# Patient Record
Sex: MALE | Race: WHITE
[De-identification: names, ages, dates, MRNs, and addresses within clinical notes are randomized per-mention and may not be internally consistent; named-entity substitution may affect disease eponyms.]

---

## 2019-02-06 NOTE — RAD
CHEST TWO VIEWS:

 

Indication: Dyspnea. 

 

Comparison: 12-4-18

 

FINDINGS: 

Mild cardiomegaly is stable. Chronic lung changes are similar appearing. Multilevel spondylosis of th
e thoracic spine is similar appearing. 

 

IMPRESSION: 

Stable exam.

 

POS: [facility]

## 2019-07-03 NOTE — ULT
US Testicular W Doppler



History: Scrotal edema.



Comparison: Testicular ultrasound 2018



Findings: Real-time grayscale, color, and spectral analysis of the testicles was performed.



Right testicle measures 4.3 x 3.3 x 3.2 cm and the left testicle measures 4.5 x 2.1 x 3.7 cm. Mild di
latation of the rete testes with tubular ectasia and a rete testis cyst. Adequate vascular flow to

both testicles. No mass. Large left scrotal varicocele.



Impression: 

1. 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

2. Left scrotal varicocele.



Transcribed Date/Time: 7/3/2019 2:21 PM



Reported By: Duane Orellana 

Electronically Signed:  7/3/2019 4:58 PM

## 2019-07-21 NOTE — RAD
XR Lumbar Spine 2 Or 3 View



HISTORY: Back pain that radiates to left leg



COMPARISON: None.



FINDINGS: The vertebral bodies are normal in height. Degenerative osteophytes are present along the c
ourse of the spine. There is mild disc narrowing at L2-3 and marked disc narrowing at L5-S1.

Pedicles are intact. Degenerative facet changes are noted.



IMPRESSION: Moderate arthritic changes of the spine.



Reported By: Randy Narvaez 

Electronically Signed:  7/21/2019 12:13 PM

## 2019-07-22 NOTE — MRI
MRI lumbar spine noncontrast



HISTORY: Low back pain with left leg radiculopathy.



FINDINGS: Vertebral body heights and alignment are maintained. There are multiple heterogeneous ashley
s of increased T2 signal and decreased T1 signal throughout the vertebral bodies. There is not

associated compromise of the central spinal canal. Conus medullaris has normal appearance. Desiccatio
n of all of the intervertebral discs.



Images of the partially included retroperitoneum show confluent adenopathy extending into the pelvis.
 Dilatation of the partially visualized renal collecting structures of each kidney.



T12-L1, L1-2: Mild osteophytosis. Central canal and neural foramina are patent.



L2-3: Disc space narrowing. Minimal disc bulge. Degenerative changes of the facets. Mild bilateral fo
raminal stenoses.



L3-4: Minimal disc bulge. Degenerative changes of the facets. Central canal is patent. Mild stenosis 
of each neural foramen.



L4-5: Minimal disc bulge. Osteophytosis of the facets. Central canal is patent. Mild stenosis of each
 neural foramen.



L5-S1: Minimal posterior disc bulge. Degenerative changes of the facets. Mild stenosis of the left ne
ural foramen.







IMPRESSION: Bulky retroperitoneal adenopathy. Metastatic disease versus lymphomatous process. Please 
consider oncologic evaluation. Dedicated CT of the chest, abdomen, and pelvis would also be

appropriate.



Probable bilateral ureteral obstruction due to the retroperitoneal neoplasm.



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



Mild degenerative changes without significant nerve root compression.



Reported By: NILDA Mistry 

Electronically Signed:  7/22/2019 4:47 PM

## 2019-07-23 NOTE — CT
CT CHEST, ABDOMEN AND PELVIS WITH CONTRAST:

7/23/19

 

INDICATIONS:

Follow-up MRI lumbar spine which showed retroperitoneal adenopathy and evidence of osseous metastasis
. 

 

CT CHEST:

Lung fields show chronic parenchymal changes.  Stranding in the posterior lower lobes extending to th
e pleural surface appears chronic. No effusion or infiltrate. No pulmonary mass or nodule. No mediast
inal adenopathy. Fixed diaphragmatic hernia. Osseous structures unremarkable on CT. 

 

IMPRESSION: 

Chronic lung parenchymal changes. No acute chest abnormality.

 

CT ABDOMEN AND PELVIS:

Fixed diaphragmatic hernia is seen as described on chest CT. 

 

The liver, spleen, and pancreas unremarkable. Post cholecystectomy changes. Adrenal glands normal. 

Both kidneys show mild bilateral hydronephrosis. There is evidence of  bilateral ureteral obstruction
 in the pelvis due to adenopathy. The urinary bladder shows abnormal soft tissue mass invading the fl
oor of the bladder and involving both UVJ regions. 

 

Small bowel loops show nonspecific distention without dilatation. Scattered stool throughout the colo
n. 

 

Aorta is normal caliber. 

 

There is confluent bulky retroperitoneal adenopathy engulfing the aorta with confluent adenopathy see
n throughout the abdomen extending to the aortic bifurcation and continuing along both iliac chains i
nto the pelvis. Bulky adenopathy within both pelvic sidewalls along the iliac chains. 

 

A large confluent lobulated mass in the pelvis which involves the floor of the bladder.  This mass me
asures 10 cm width x 9 cm AP dimension in the axial plane. 

 

The osseous lesion seen in the lumbar spine on MRI are not as well delineated on CT. There are areas 
of heterogeneity and sclerosis within the vertebral bodies at these sites. No lytic process apparent.


 

IMPRESSION: 

A large lobulated soft tissue mass in the pelvis involves the floor of the bladder. There is bulky co
nfluent adenopathy throughout the retroperitoneum. Findings may represent primary bladder malignancy 
with associated adenopathy. Lymphoma is also a consideration.  This bladder mass is producing bilater
al ureteral obstruction. 

 

POS: [facility]

## 2019-07-24 NOTE — CON
DATE OF CONSULTATION:  



REASON FOR CONSULT:  Bladder mass.



HISTORY OF PRESENT ILLNESS:  Mr. Kilpatrick is a pleasant 78-year-old gentleman, who

over the last several weeks has developed lower back pain, left lower leg weakness,

and urinary incontinence.  He is being worked up by his primary care physician and

Dr. Zuñiga.  He underwent a lumbar spine MRI yesterday in the outpatient setting.

It showed bulky retroperitoneal adenopathy.  There were metastatic lesions in the

lumbar spine.  There was a bilateral ureteral obstruction due to retroperitoneal

adenopathy.  There was no central canal compromise.  He was sent to the emergency

room for further workup and evaluation.  He then underwent a chest, abdomen, and

pelvis CT, confirming the bilateral hydronephrosis.  There was an abnormal soft

tissue mass invading the floor of the bladder, it was measured 10 x 9 cm.  He has

been getting Norco for pain control and is feeling better.  Dr. Zuñiga has been

consulted for bladder biopsy. 



PAST MEDICAL HISTORY:  

1. Hypertension.

2. Diabetes mellitus type 2.

3. BPH.



PAST SURGICAL HISTORY:  

1. Appendectomy.

2. Achilles tendon repair.

3. Hernia repair.

4. Cholecystectomy.

5. Right shoulder surgery.

6. Cataract surgery.

7. Basal cell excision.

8. Cardiac catheterization.



ALLERGIES:  TO PENICILLIN AND SULFA.



HOME MEDICATIONS:  

1. Norvasc 5 mg daily.

2. Aspirin 81 daily.

3. Drisdol daily.

4. Prilosec daily.

5. Lovastatin daily.

6. Lyrica daily.

7. Selenium daily.

8. CoQ10 daily.

9. Valsartan/hydrochlorothiazide daily.



FAMILY HISTORY:  Noncontributory.



SOCIAL HISTORY:  Remote history of smoking.  No alcohol or illicit drug use.



REVIEW OF SYSTEMS:  CONSTITUTIONAL:  No fever, chills, or night sweats. 

EYES:  No blurred or double vision. 

ENT:  No pain, hoarseness, sore throat, or dysphagia. 

CV:  No chest pain, palpitations, or syncope. 

RESPIRATORY:  No shortness of breath, dyspnea on exertion, or orthopnea. 

GI:  No nausea, vomiting, diarrhea, constipation, or abdominal pain. 

:  Positive for dysuria.  No hematuria. 

MUSCULOSKELETAL:  Positive for back and leg pain. 

SKIN:  No rash or pruritus. 

HEMATOLOGIC:  No bleeding, bruising, or clotting. 

NEUROLOGIC:  Positive for weakness.  No headache.  Positive for numbness and

tingling on his feet. 



PHYSICAL EXAMINATION:

VITAL SIGNS:  Temperature is 98.4, pulse is 68, respiratory rate is 16, and BP is

128/62.  He is 96% on room air. 

GENERAL:  This is a well-developed, well-nourished male, in no acute distress. 

HEENT:  Normocephalic and atraumatic.  Pupils are equal and reactive to light. 

NECK:  Supple. 

CV:  Regular rate and rhythm. 

LUNGS:  Clear. 

ABDOMEN:  Soft and nontender.  Bowel sounds are positive. 

EXTREMITIES:  No clubbing, cyanosis, or edema. 

SKIN:  No rash. 

HEMATOLOGIC:  No petechiae or purpura. 

NEUROLOGIC:  He does have some left weakness, 4/5. 

PSYCH:  He is alert and oriented.



PERTINENT LABS AND X-RAYS:  Current WBCs are 8.2, hemoglobin 9.6, hematocrit 30.2,

platelet count 282,000, 76% neutrophils, and 13% lymphocytes.  Sodium is 132,

potassium is 3.5, chloride is 98, CO2 is 24, BUN is 28, creatinine is 1.35, lactic

acid is 0.8, calcium is 8.6, bilirubin is 0.7, AST is 13, ALT is 17, and alkaline

phosphatase is 219.  Troponin is negative and BNP is 477.  Serum total protein 6.5,

albumin 3.5, and globulin 3.0. 



ASSESSMENT:  Large bladder mass with bone and lymph node mass.



DISCUSSION:  Dr. Zuñiga has been consulted.  Plan is to do biopsy of his bladder

mass.  The patient's pain is being controlled with Norco.  We will discuss the

lumbar MRI with Dr. Darby.  There does not appear to be any area that can be

radiated for pain relief.  We will have further recommendations based on pathology

results. 



Thank you for the consult.  We will follow along with his hospital course.







Job ID:  [id number]

## 2019-07-24 NOTE — PRG
DATE OF SERVICE:  07/24/2019



SUBJECTIVE:  The patient is seen and examined at the bedside.  There were several

family members in the room during my visit.  The patient states that his pain is

quite well controlled on his pain medications at this time. 



OBJECTIVE:  VITAL SIGNS:  Blood pressure is 128/62, pulse is 68, temperature is

98.4, respirations 16, O2 saturation is 96% on room air. 

HEENT:  His head is atraumatic and normocephalic.  Eyes are PERRLA.  Sclerae are

nonicteric.  Conjunctivae palish.  Oral mucosa is moist. 

NECK:  Supple. 

LUNGS:  Clear. 

HEART:  S1, S2 normal. 

ABDOMEN:  Soft, nontender, nondistended. 

EXTREMITIES:  No clubbing.  There is some 1+ peripheral edema, especially on the

left side. 

NEUROLOGIC:  He is alert and oriented x4.  There is no any motor or sensory deficits

present.  Cranial nerves are intact. 



LABORATORY DATA:  Showed white count of 8.2, hemoglobin 9.6, hematocrit 30.2,

platelet count is 282,000.  Sodium of 132, potassium 3.5, chloride 98, CO2 of 24,

BUN 28, creatinine 1.35.  Glucose is within normal limits.  Calcium 8.6.

Microbiology; blood cultures x2, no growth.  Urine culture x1, no growth at 12

hours. 



IMPRESSION:  

1. 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  He is controlled with

hydrocodone and p.r.n. morphine. 

2. Acute kidney injury, mild hydronephrosis bilaterally.

3. Urinary bladder tumor.

4. Hypertension.



PLAN:  We are awaiting for Dr. Zuñiga, who is aware of the patient.  He is going to

come and set him up for most likely a cystoscopy and biopsy of the tumor and

__________ stents in both ureters to relieve the mild hydronephrosis.  The patient

was seen by Oncology team and we are awaiting biopsy at this point.  I am going to

start the 

patient on Senokot routine dosing since he will most likely get constipated from his

high dose of Norco he is taking at this point, and we will continue his other

medications with amlodipine, vitamin D, Pregabalin, Zocor, and Diovan.  Going to

stop his sliding scale and Accu-Cheks because he is not a diabetic. 







Job ID:  [id number]

## 2019-07-24 NOTE — PDOC.FPRHP
- History of Present Illness


Chief Complaint: Low back pain, new mets


History of Present Illness: 


Mr. Kilpatrick is a pleasant 78 year old man with no known history of malignancy 

who developed lower back pain


several weeks ago that has progressively worsened over the last couple of 

weeks. The pain has been shifting 


from radiating to left hip or right hip. He also has occasional pain down his 

left leg to his thigh. No lower leg 


weakness.  Reports long standing altered sensation in lower legs due to 

underlying neuropathy without


any recent changes.  Also has a previous injury to right achiles tendon which 

has always caused trouble


with walking.  





He reports intermittent urinary incontinence which he states tends to happen if 

he holds his urine too long and 


unexpectedly at night while sleeping, requiring him to wear adult depends at 

night.  He denies any


hematuria or dysuria.  No groin numbness or saddle anesthesia/paraesthesias.  

Has not experienced


any bowel incontinence, though he does suffer from constipation chronically, 

managed with colace


and metamucil without significant improvement. 





No headaches or dizziness. No blurred vision or speech disturbances. 


ED Course: 


He is s/p Lumbar spine xray following by MRI L Spine bulky retroperitoneal 


adenopathy. Metastatic disease versus lymphomatous process.  


Probable bilateral ureteral obstruction due to the retroperitoneal neoplasm.


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


Mild degnerative changes without significant nerve root compression. 





In ED he had a CT CAP: Large lobulated soft tissue mass in the pelvis involving 


the floor of the bladder.  Bulky confluent adenopathy thoughout peritoneum. 


Mass producing bilateral ureteral obstruction, it measures 10cm x 9 cm. 





- Allergies/Adverse Reactions


 Allergies











Allergy/AdvReac Type Severity Reaction Status Date / Time


 


Penicillins Allergy   Verified 07/24/19 01:23


 


Sulfa (Sulfonamide Allergy   Verified 07/24/19 01:23





Antibiotics)     














- Home Medications


 











 Medication  Instructions  Recorded  Confirmed  Type


 


Amlodipine [Norvasc] 5 mg PO QPM 07/24/19 07/24/19 History


 


Aspirin 81 mg PO DAILY 07/24/19 07/24/19 History


 


Calcium Carb/Magnesium Oxid/D3 1 tab DT DAILY 07/24/19 07/24/19 History





[Calcium Magnesium + Vitamin D]    


 


Ergocalciferol [Drisdol] 1.25 mg PO SEEPHYS 07/24/19 07/24/19 History


 


Esomeprazole Magnesium [NexIUM] 40 mg PO ASDIR 07/24/19 07/24/19 History


 


Fluticasone Propionate [Flonase 2 spray INH DAILY 07/24/19 07/24/19 History





Nasal Spray]    


 


Glucosam/Chondr-Msm1/D3/C/Bubba 1 tab PO DAILY 07/24/19 07/24/19 History





[Glucosamine Chondroitin Complex]    


 


Lovastatin [Mevacor] 20 mg PO HS 07/24/19 07/24/19 History


 


Multivit-Min/FA/Lycopen/Lutein 1 tab PO DAILY 07/24/19 07/24/19 History





[Centrum Silver Tablet]    


 


Pregabalin [Lyrica] 50 mg PO SEEPHYS 07/24/19 07/24/19 History


 


Selenium 100 mcg PO DAILY 07/24/19 07/24/19 History


 


Tadalafil 5 mg PO HS 07/24/19 07/24/19 History


 


Ubidecarenone [Co Q-10] 100 mg PO DAILY 07/24/19 07/24/19 History


 


Valsartan/Hydrochlorothiazide 1 tablet PO DAILY 07/24/19 07/24/19 History





[Diovan HCT]    














- History


PMHx:


 


PSHx: 





FHx:


 


Social:


 








- Vital signs


BP: []  HR: [] RR: [] Tmax: [] Pox: []% on []  Wt: []   








- Physical Exam


Constitutional: NAD, awake, alert and oriented, well developed


HEENT: normocephalic and atraumatic, PERRLA, EOMI, no scleral icterus, normal 

nasal mucosa, MMM, oropharynx clear


Neck: supple


Chest: no-tender to palpation


Heart: RRR, normal S1/S2, pulses present, no edema


Lungs: CTAB, no respiratory distress, good air movement, no rales/rhonchi, no 

wheezing, no retractions


Abdomen: soft, non-tender, bowel sounds present, no masses/distention


Musculoskeletal: normal structure, normal tone, ROM grossly normal


Neurological: no focal deficit, CN II-XII intact


Skin: no rash/lesions, good turgor


Psychiatric: normal mood and affect, good judgment and insight





FMR H&P: Results





- Labs


Result Diagrams: 


 07/23/19 18:14





 07/23/19 18:14


Lab results: 


 











WBC  8.0 thou/uL (4.8-10.8)   07/23/19  18:14    


 


Hgb  10.1 g/dL (14.0-18.0)  L  07/23/19  18:14    


 


Hct  30.6 % (42.0-52.0)  L  07/23/19  18:14    


 


MCV  87.7 fL (78.0-98.0)   07/23/19  18:14    


 


Plt Count  276 thou/uL (130-400)   07/23/19  18:14    


 


Neutrophils %  78.4 % (42.0-75.0)  H  07/23/19  18:14    


 


Sodium  130 mmol/L (136-145)  L  07/23/19  18:14    


 


Potassium  3.6 mmol/L (3.5-5.1)   07/23/19  18:14    


 


Chloride  97 mmol/L ()  L  07/23/19  18:14    


 


Carbon Dioxide  22 mmol/L (23-31)  L  07/23/19  18:14    


 


BUN  30 mg/dL (8.4-25.7)  H  07/23/19  18:14    


 


Creatinine  1.48 mg/dL (0.7-1.3)  H  07/23/19  18:14    


 


Glucose  116 mg/dL ()  H  07/23/19  18:14    


 


Lactic Acid  0.8 mmol/L (0.5-2.2)   07/23/19  18:31    


 


Calcium  8.6 mg/dL (7.8-10.44)   07/23/19  18:14    


 


Total Bilirubin  0.7 mg/dL (0.2-1.2)   07/23/19  18:14    


 


AST  13 U/L (5-34)   07/23/19  18:14    


 


ALT  17 U/L (8-55)   07/23/19  18:14    


 


Alkaline Phosphatase  219 U/L ()  H  07/23/19  18:14    


 


Serum Total Protein  6.5 g/dL (5.8-8.1)   07/23/19  18:14    


 


Albumin  3.5 g/dL (3.4-4.8)   07/23/19  18:14    


 


Urine Ketones  Negative mg/dL (Negative)   07/23/19  19:37    


 


Urine Blood  Negative  (Negative)   07/23/19  19:37    


 


Urine Nitrite  Negative  (Negative)   07/23/19  19:37    


 


Ur Leukocyte Esterase  Negative Garfield/uL (Negative)   07/23/19  19:37    














FMR H&P: Upper Level





- Plan


Date/Time: 07/24/19 0425








I, [], have evaluated this patient and agree with findings/plan as outlined by 

intern resident. Pertinent changes/additions are listed here.

## 2019-07-24 NOTE — CON
DATE OF CONSULTATION:  07/24/2019



CONSULTING PHYSICIAN:  Ange Max MD



REASON FOR CONSULTATION:  Bladder mass.



HISTORY OF PRESENT ILLNESS:  Mr. Kilpatrick is an 78-year-old white male, who is well

known to me for a history of previous urinary complaints.  He has had problems with

poor flow with urinary frequency and occasional urge incontinence.  He presented to

my office on June 24th, at which time, he was complaining of scrotal swelling and

swelling on the penis as well as lower extremity swelling.  It was not entirely

clear at that time where that was coming from.  We did order a scrotal ultrasound,

which did not show any specific abnormalities.  We had talked about treatment of BPH

more aggressively, but decided to work him up first with a cystoscopy and TRUS.

This was scheduled for August, but unfortunately, the patient had progressively

worsening back pain to the point where he could not tolerate.  He went to see his

PCP, who ordered an MRI of his back to see if he had a herniated disk, which

demonstrated a mass along the spine.  She then ordered a CT of the chest, abdomen,

pelvis, which demonstrated a large pelvic mass with several enlarged lymph nodes

throughout the retroperitoneum and abdomen.  The patient was then sent to the

emergency room where he was admitted to the hospital on the Oncology ralph.  He does

have evidence of bilateral hydronephrosis on CT with a pelvic mass, which appears to

be invading the posterior bladder wall.  I have been consulted for further

assistance on this issue.  On my discussion with the patient, he states that he is

having a lot of back pain.  He is still urinating approximately between 150 to 200

mL at a time, although he states that he has to urinate almost every hour.  He is

still having some difficulty with urination.  He denies having any blood in his

urine.  Urinalysis done at the time of the office visit in June did not demonstrate

any hematuria and was completely normal.  Urinalysis here also was normal.  The

patient also reports that he has been having fevers as well as night sweats at home.

 He has not lost a significant amount of weight, but states he has a very poor

appetite currently.  He continues to have some scrotal edema, although it is better

than before.  He still has some lower extremity edema as well. 



HOME MEDICATIONS:  

1. Lovastatin.

2. Nexium.

3. Amlodipine.

4. Ergocalciferol.

5. Diovan.

6. Coenzyme Q10.

7. Tadalafil.

8. Selenium.

9. Lyrica.

10. Multivitamin.

11. Chondroitin and glucosamine.

12. Flonase.

13. Calcium plus vitamin D.

14. Aspirin.



ALLERGIES:  

1. PENICILLIN.

2. SULFA.



PAST MEDICAL HISTORY:  

1. Hypertension.

2. Hyperlipidemia.

3. Gastroesophageal reflux disease.

4. BPH.

5. Hiatal hernia.

6. COPD.

7. Colon polyps.



PAST SURGICAL HISTORY:  

1. Nissen fundoplication. 

2. Inguinal hernia repair.

3. Cholecystectomy.

4. Keratosis removal.

5. Basal cell carcinoma removal.

6. Appendectomy.

7. Right knee meniscus repair.

8. Cataract surgery.

9. Heart catheterization.

10. Anterior tibialis tendon rupture repair.

11. EGD/colonoscopy.



FAMILY HISTORY:  Significant for diabetes and stroke as well as heart disease with a

daughter with breast cancer. 



SOCIAL HISTORY:  The patient is a former smoker, but quit in 1968.  Denies illicit

drugs.  Drinks alcohol socially only. 



REVIEW OF SYSTEMS:  A 12-point review of systems is unremarkable other than what was

commented on the HPI, specifically the fevers, night sweats, poor appetite, lower

extremity swelling, scrotal edema and back pain.  Remainder of review of systems is

otherwise negative. 



PHYSICAL EXAMINATION:

GENERAL:  Appears uncomfortable, is communicative, appears stated age.  Answering

questions appropriately.  Well nourished, well developed. 

HEENT:  Normocephalic, atraumatic.  Pupils are symmetric and round.  Sclerae

nonicteric.  Trachea midline.  Moist mucous membranes. 

CARDIOVASCULAR:  Regular rate and rhythm.  Normal S1 and S2.  Symmetric pulses. 

CHEST:  No increased work of breathing.  Symmetric expansion of the lungs, clear

anteriorly. 

ABDOMEN:  Soft, nontender, and nondistended.  Positive bowel sounds.  No

hepatosplenomegaly.  No hernias. 

GENITOURINARY:  There is mild scrotal edema with mild penile edema.  Some suprapubic

fullness.  Otherwise, testicles are bilaterally nontender. 

RECTAL:  Deferred at this time. 

EXTREMITIES:  No clubbing or cyanosis.  1+ edema bilaterally. 

MUSCULOSKELETAL:  No obvious joint deformities or joint erythema noted.  Full range

of motion, 4/5 strength. 

NEUROLOGIC:  Cranial nerves 2 through 12 grossly intact.  No focal or sensory motor

deficits identified. 

SKIN:  Warm and dry.  No rashes or lesions.  Good turgor. 

LYMPHS:  No obvious supraclavicular or inguinal lymphadenopathy. 

PSYCHIATRIC:  Alert and oriented x3.  Appropriate mood and affect.



LABORATORY EVALUATION:  The full set of labs are in the [facility] system, which I

have reviewed.  Of note, the patient's white count is 8.2 with hemoglobin 9.6,

platelet count of 282.  Creatinine is 1.35.  Urinalysis is completely normal.

Imaging, CT chest, abdomen and pelvis from July 23rd demonstrates a large lobulated

soft tissue mass in the pelvis involving the floor of the bladder.  There is bulky

confluent adenopathy throughout the retroperitoneum.  Findings may be consistent

with a primary bladder mass or possibly lymphoma.  The bladder mass is producing

bilateral ureteral obstruction with bilateral hydronephrosis. 



ASSESSMENT AND PLAN:  An 78-year-old white male with a large pelvic mass and

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  Given the

patient's constitutional symptoms of night sweats and fevers, as well as lymphedema

of the scrotum and lower extremities and in the pelvic area with previous history of

scrotal and genital swelling, this is the most likely diagnosis.  Bladder cancer is

extremely unlikely given the patient has had normal urinalysis on multiple occasions

without any reported history of hematuria.  The patient will need a tissue diagnosis

for confirmation, which should be easily accessible via the rectum.  I would

recommend that the patient go to the operating room tomorrow, where we will perform

a cystoscopy to ensure that he does not have any kind of bladder cancer or bladder

source.  If there is obvious mass within the bladder, we can resect this via

transurethral resection.  If the ureters are identifiable, we can attempt to place

ureteral stents bilaterally for relief of the obstruction.  If ureters cannot be

identified or ureteral stent cannot be passed, then the patient will likely require

bilateral nephrostomy tubes.  If tissue samples cannot be obtained from the bladder

and there is no obvious mucosal abnormalities, then, I will attempt to perform a

transrectal pelvic mass guided biopsy in the same fashion of how a prostate biopsy

is performed.  He will require an enema preoperatively and we will give him

levofloxacin in addition to the Ancef that he is already receiving.  This procedure

should be able to be done while on a baby aspirin as the bleeding risk should be

nominal.  I have discussed all of these procedures with the patient and he states he 

would like to proceed forward.  Risks of the surgery were discussed, which include,

but are not limited to significant bleeding, infection, damage to the urethra,

bladder or bladder perforation, rectal injury, intraabdominal bleeding,

nondiagnostic results, ureteral injury, and need for further procedures.  We will

plan to take him to the operating room tomorrow for the above-listed procedures. 





Job ID:  [id number]

## 2019-07-24 NOTE — PDOC.EVN
Event Note





- Event Note


Event Note: 





Patient Name: LATISHA KILPATRICK Medical Record Number: [id number]


YOB: 1940 Patient Status: Observation


Attending Provider: Aaron Medina Account Number: [id number]


Date: 07/24/19 04:25 Initialization Date: 07/24/19 04:25








DICTATION SYSTEM DOWN.  THIS SERVES AS H&P. 





- History of Present Illness


Chief Complaint: Low back pain, new mets


History of Present Illness: 


Mr. Kilpatrick is a pleasant 78 year old man with no known history of malignancy 

who developed lower back pain


several weeks ago that has progressively worsened over the last couple of 

weeks. The pain has been shifting 


from radiating to left hip or right hip. He also has occasional pain down his 

left leg to his thigh. No lower leg 


weakness.  Reports long standing altered sensation in lower legs due to 

underlying neuropathy without


any recent changes.  Also has a previous injury to right achiles tendon which 

has always caused trouble


with walking.  





He reports intermittent urinary incontinence which he states tends to happen if 

he holds his urine too long and 


unexpectedly at night while sleeping, requiring him to wear adult depends at 

night.  He denies any


hematuria or dysuria.  No groin numbness or saddle anesthesia/paraesthesias.  

Has not experienced


any bowel incontinence, though he does suffer from constipation chronically, 

managed with colace


and metamucil without significant improvement. 





No headaches or dizziness. No blurred vision or speech disturbances. 





ED Course: 


He is s/p Lumbar spine xray following by MRI L Spine bulky retroperitoneal 


adenopathy. Metastatic disease versus lymphomatous process.  


Probable bilateral ureteral obstruction due to the retroperitoneal neoplasm.


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


Mild degnerative changes without significant nerve root compression. 





In ED he had a CT CAP: Large lobulated soft tissue mass in the pelvis involving 


the floor of the bladder.  Bulky confluent adenopathy thoughout peritoneum. 


Mass producing bilateral ureteral obstruction, it measures 10cm x 9 cm. 





- Allergies/Adverse Reactions


 Allergies











Allergy/AdvReac Type Severity Reaction Status Date / Time


 


Penicillins Allergy   Verified 07/24/19 01:23


 


Sulfa (Sulfonamide Allergy   Verified 07/24/19 01:23





Antibiotics)     














- Home Medications


 











 Medication  Instructions  Recorded  Confirmed  Type


 


Amlodipine [Norvasc] 5 mg PO QPM 07/24/19 07/24/19 History


 


Aspirin 81 mg PO DAILY 07/24/19 07/24/19 History


 


Calcium Carb/Magnesium Oxid/D3 1 tab DT DAILY 07/24/19 07/24/19 History





[Calcium Magnesium + Vitamin D]    


 


Ergocalciferol [Drisdol] 1.25 mg PO SEEPHYS 07/24/19 07/24/19 History


 


Esomeprazole Magnesium [NexIUM] 40 mg PO ASDIR 07/24/19 07/24/19 History


 


Fluticasone Propionate [Flonase 2 spray INH DAILY 07/24/19 07/24/19 History





Nasal Spray]    


 


Glucosam/Chondr-Msm1/D3/C/Bubba 1 tab PO DAILY 07/24/19 07/24/19 History





[Glucosamine Chondroitin Complex]    


 


Lovastatin [Mevacor] 20 mg PO HS 07/24/19 07/24/19 History


 


Multivit-Min/FA/Lycopen/Lutein 1 tab PO DAILY 07/24/19 07/24/19 History





[Centrum Silver Tablet]    


 


Pregabalin [Lyrica] 50 mg PO SEEPHYS 07/24/19 07/24/19 History


 


Selenium 100 mcg PO DAILY 07/24/19 07/24/19 History


 


Tadalafil 5 mg PO HS 07/24/19 07/24/19 History


 


Ubidecarenone [Co Q-10] 100 mg PO DAILY 07/24/19 07/24/19 History


 


Valsartan/Hydrochlorothiazide 1 tablet PO DAILY 07/24/19 07/24/19 History





[Diovan HCT]    














- History


PMHx: 


1.  Hypertension 


2.  Diabetes, type 2


3.  BPH. 


 


PSHx: 


1.  Appendectomy. 


2.  Right achiles tendon repair. 


3.  Inguinal hernia repair.


4.  Cholecystectomy. 


5.  Right shoulder surgery. 


6.  Bilateral cataract surgery. 


7.  Basal cell carcinoma excision. 


8.  Heart catheterization.


 


Social: Previous smoker. Quit >50 years ago. No heavy/daily alcohol. No drug 

use. 


 








- Vital signs


BP: [144/69]  HR: [81] RR: [18] Tmax: [99.4] Pox: [98%]% on [RA]  








- Physical Exam


Constitutional: NAD, awake, alert and oriented, well developed


HEENT: normocephalic and atraumatic, PERRLA, EOMI, no scleral icterus, normal 

nasal mucosa, MMM, oropharynx clear


Neck: supple


Chest: no-tender to palpation


Heart: RRR, normal S1/S2, pulses present, no edema


Lungs: CTAB, no respiratory distress, good air movement, no rales/rhonchi, no 

wheezing, no retractions


Abdomen: soft, non-tender, bowel sounds present, no masses/distention


Musculoskeletal: normal structure, normal tone, ROM grossly normal


Neurological: no focal deficit, CN II-XII intact


Skin: no rash/lesions, good turgor


Psychiatric: normal mood and affect, good judgment and insight











- Labs


Result Diagrams: 


 07/23/19 18:14





 07/23/19 18:14


Lab results: 


 











WBC  8.0 thou/uL (4.8-10.8)   07/23/19  18:14    


 


Hgb  10.1 g/dL (14.0-18.0)  L  07/23/19  18:14    


 


Hct  30.6 % (42.0-52.0)  L  07/23/19  18:14    


 


MCV  87.7 fL (78.0-98.0)   07/23/19  18:14    


 


Plt Count  276 thou/uL (130-400)   07/23/19  18:14    


 


Neutrophils %  78.4 % (42.0-75.0)  H  07/23/19  18:14    


 


Sodium  130 mmol/L (136-145)  L  07/23/19  18:14    


 


Potassium  3.6 mmol/L (3.5-5.1)   07/23/19  18:14    


 


Chloride  97 mmol/L ()  L  07/23/19  18:14    


 


Carbon Dioxide  22 mmol/L (23-31)  L  07/23/19  18:14    


 


BUN  30 mg/dL (8.4-25.7)  H  07/23/19  18:14    


 


Creatinine  1.48 mg/dL (0.7-1.3)  H  07/23/19  18:14    


 


Glucose  116 mg/dL ()  H  07/23/19  18:14    


 


Lactic Acid  0.8 mmol/L (0.5-2.2)   07/23/19  18:31    


 


Calcium  8.6 mg/dL (7.8-10.44)   07/23/19  18:14    


 


Total Bilirubin  0.7 mg/dL (0.2-1.2)   07/23/19  18:14    


 


AST  13 U/L (5-34)   07/23/19  18:14    


 


ALT  17 U/L (8-55)   07/23/19  18:14    


 


Alkaline Phosphatase  219 U/L ()  H  07/23/19  18:14    


 


Serum Total Protein  6.5 g/dL (5.8-8.1)   07/23/19  18:14    


 


Albumin  3.5 g/dL (3.4-4.8)   07/23/19  18:14    


 


Urine Ketones  Negative mg/dL (Negative)   07/23/19  19:37    


 


Urine Blood  Negative  (Negative)   07/23/19  19:37    


 


Urine Nitrite  Negative  (Negative)   07/23/19  19:37    


 


Ur Leukocyte Esterase  Negative Garfield/uL (Negative)   07/23/19  19:37    




















- Impression/Plan


1.  Low back pain.  Secondary to new metastasis.  Discontinued toradol due to 

renal insufficiency. 


Will give hydrocodone 10/325 mg for mild/moderate pain and morphine 4 mg PRN 

for severe pain. No neuro


deficits at present, except for bladder incontinence which is likely due to 

large bladder mass rather than


due to spinal mets. 


2.  FRANCISCO JAVIER.  Gentle hydration. Patient with new bladder mass/lymphadenopathy 

causing ureteral obstruction. 


Patient still urinating.  Mild hydronephrosis on CT.  Urology consult placed. 


3.  New bladder mass.  Possibly bladder cancer, metastatic.  Consult placed to 

Oncology and as mentioned to


Urology.  He follows with Dr. Darby for BPH and Dr. Zuñiga.  Recent 

colonoscopy in 11/2018 with polyps removed. 


4.  Hypertension.  Resume home meds and monitor BP.


5.  Diabetes.  Hold home meds due to renal function.  Insulin sliding scale and 

monitor glucose. 


6.  GI Prophylaxis. 


7.  DVT prophylaxis.  Rod ventura.  Hold on pharmacoprophylaxis given possibility 

of procedures/biopsy. 


8.  Code Status:  FULL.  Consider palliative care consult for advanced 

directives/complex decision making. 





DIC

## 2019-07-25 NOTE — OP
DATE OF PROCEDURE:  07/25/2019



SERVICE:  Urology.



PREOPERATIVE DIAGNOSIS:  Pelvic mass with bilateral hydronephrosis.



POSTOPERATIVE DIAGNOSIS:  Pelvic mass with bilateral hydronephrosis.



PROCEDURES PERFORMED:  Cystoscopy with bilateral ureteral stent placement, 6 x 26

double-J stent, right retrograde pyelogram, and transrectal pelvic mass biopsy. 



INDICATIONS FOR PROCEDURE:  Mr. Kilpatrick is an 78-year-old white male who previously

had seen me for BPH and lower urinary symptoms.  He developed scrotal edema which

was being worked up during this time, ended up with severe back pain, which was

found to be caused by significant retroperitoneal lymphadenopathy and a large pelvic

mass.  He also had bilateral hydronephrosis.  He is now coming to the OR for

attempted stent placement, diagnostic cystoscopy, and transrectal biopsy of the mass

for tissue diagnosis.  Risks and benefits were discussed and he has agreed to

proceed forward. 



DESCRIPTION OF PROCEDURE:  After identification of armband and verification of

consent, the patient was brought back to the operating room where he underwent

general anesthesia with an LMA.  He was placed in dorsal lithotomy position and

prepped and draped in usual sterile fashion.  After appropriate time-out, a

lubricated 22-Croatian rigid cystoscope was introduced per urethra into the bladder.

The prostate was only mildly hypertrophic, but had a high bladder neck, likely

secondary to anterior compression from the pelvic mass.  The bladder mucosa appeared

normal.  There were no tumors or irregularities along the mucosa.  Grade 2

trabeculation.  No cellules or diverticula.  There was a large bulge along the right

pelvic floor indenting into the bladder, but not through the bladder.  The mucosa

was normal.  Both ureters were identified although they were difficult to identify.

The right wound was significantly more deformed than the left.  A 5-Croatian Pollack

catheter was able to be inserted into the distal right ureter and a retrograde

pyelogram performed indicating we were indeed within the ureter, which showed

significant hydronephrosis and a tortuous ureter.  A Sensor wire was advanced

through the Pollack catheter up to the level of renal pelvis.  The Pollack catheter

was then removed and a 6 x 26 double-J stent advanced over the Sensor wire up to the

level of renal pelvis.  The wire was then removed leaving a good curl in the kidney

and a good curl in the bladder.  Attention was turned to the left ureteral orifice

at which the same procedure was repeated, but without the Pollack catheter or the

retrograde pyelogram.  The Sensor wire was able to be advanced up with ease.  The 6

x 26 double-J stent and then the wire removed leaving curled on both the kidney and

bladder portions of the stent.  The bladder was then emptied and the cystoscope

removed.  The patient was then changed to the left lateral decubitus position with

the knees tucked up toward the chest.  A transrectal ultrasound probe was introduced

per rectum with adequate lubrication.  The large pelvic mass was seen primarily on

the right side of the patient's pelvis with significant anterior compression of the

bladder and possible invasion into the prostate.  The seminal vesicles could not be

identified.  Using the transrectal biopsy needle, the mass was biopsied to a total

of 10 times from different portions including the middle, anterior, posterior, and

lateral aspects of the mass.  These were all sent for fresh frozen sections as

requested by Dr. Che, who stated that if this suspected diagnosis is lymphoma,

the specimen should not go in formalin.  These were all submitted as expeditiously

as possible to the pathology lab.  The rectal probe was then removed.  There was no

significant bleeding noted.  The patient was then returned back to the supine

position.  He was awakened, taken to PACU for recovery in stable condition. 



COMPLICATIONS:  None.



ESTIMATED BLOOD LOSS:  Minimal.



RETAINED TUBES AND DRAINS:  A 6 x 26 double-J stents bilaterally.



SPECIMENS:  Transrectal pelvic mass biopsies.



DISPOSITION:  The patient will be admitted back to the hospital on the Hospitalist

Service.  We will await the biopsy results to determine what the next best course of

action is.  I will also monitor his creatinine.  If the patient remains

hydronephrotic with elevated creatinine despite stents, then I would recommend we

performed cystoscopic stent removals and the patient have a percutaneous nephrostomy

tubes placed instead. 





Job ID:  [id number]

## 2019-07-25 NOTE — RAD
RETROGRADE PYELOGRAM:

7/25/19

 

INDICATIONS:

Fluoroscopic images taken during retrograde study with stent placement. Two images are presented.

 

FINDINGS/IMPRESSION: 

Initial image show opacification of the right ureter. Partial opacification of the right collecting s
tructure.

 

The final image shows bilateral ureteral stents. Residual contrast in the right upper collecting stru
ctures on this second image. 

 

POS: SUSANNAH

## 2019-07-25 NOTE — PDOC.HOSPP
- Subjective


Subjective: 





urinary frequency, fever, some cough





- Objective


Vital Signs & Weight: 


 Vital Signs (12 hours)











  Temp Pulse Resp BP Pulse Ox


 


 07/25/19 05:32  98.1 F    


 


 07/24/19 21:43  98.6 F    


 


 07/24/19 20:00  100.3 F H  74  18  125/56 L  99








 Weight











Weight                         205 lb














I&O: 


 











 07/24/19 07/25/19 07/26/19





 06:59 06:59 06:59


 


Intake Total  1450 


 


Output Total  800 


 


Balance  650 











Result Diagrams: 


 07/24/19 03:59





 07/24/19 03:59


Additional Labs: 


 Accuchecks











  07/24/19





  12:32


 


POC Glucose  85














ROS





- Review of Systems


All systems: 


All other ROS were reviewed and found negative.








- Medication


Medications: 


Active Medications











Generic Name Dose Route Start Last Admin





  Trade Name Freq  PRN Reason Stop Dose Admin


 


Acetaminophen  650 mg  07/24/19 02:18  07/24/19 19:45





  Tylenol  PO   650 mg





  Q4H PRN   Administration





  Headache/Fever/Mild Pain (1-3)   





     





     





     


 


Hydrocodone Bitart/Acetaminophen  1 tab  07/24/19 02:23  07/25/19 05:27





  [place] 10/325  PO   1 tab





  Q4H PRN   Administration





  Moderate Pain (4-6)   





     





     





     


 


Amlodipine Besylate  5 mg  07/24/19 21:00  07/24/19 19:45





  Norvasc  PO   5 mg





  QPM GRACE   Administration





     





     





     





     


 


Coenzyme Q10  100 mg  07/24/19 09:00  07/24/19 08:10





  Coenzyme Q10  PO   Not Given





  DAILY GRACE   





     





     





     





     


 


Famotidine  20 mg  07/24/19 09:00  07/24/19 08:10





  Pepcid  SLOW IVP   20 mg





  DAILY GRACE   Administration





     





     





     





     


 


Guaifenesin  200 mg  07/25/19 05:43  07/25/19 06:06





  Robitussin Sf  PO   200 mg





  Q8H PRN   Administration





  Cough   





     





     





     


 


Hydrochlorothiazide  25 mg  07/24/19 09:00  07/24/19 08:10





  Hydrochlorothiazide  PO   25 mg





  DAILY GRACE   Administration





     





     





     





     


 


Sodium Chloride  1,000 mls @ 100 mls/hr  07/24/19 15:30  07/25/19 01:56





  Normal Saline 0.9%  IV   1,000 mls





  .Q10H GRACE   Administration





     





     





     





     


 


Pregabalin  50 mg  07/24/19 09:00  07/24/19 08:09





  Lyrica  PO   50 mg





  QAM GRACE   Administration





     





     





     





     


 


Pregabalin  100 mg  07/24/19 21:00  07/24/19 19:46





  Lyrica  PO   100 mg





  QPM GRACE   Administration





     





     





     





     


 


Senna/Docusate Sodium  2 tab  07/24/19 21:00  07/24/19 19:45





  Senokot S  PO   2 tab





  BID GRACE   Administration





     





     





     





     


 


Simvastatin  10 mg  07/24/19 21:00  07/24/19 19:45





  Zocor  PO   10 mg





  HS GRACE   Administration





     





     





     





     


 


Sodium Chloride  10 ml  07/24/19 09:00  07/24/19 19:46





  Flush - Normal Saline  IVF   Not Given





  Q12HR GRACE   





     





     





     





     


 


Valsartan  320 mg  07/24/19 09:00  07/24/19 08:10





  Diovan  PO   320 mg





  DAILY GRACE   Administration





     





     





     





     














- Exam


NAD


Neck: no JVD


Heart: RRR, no murmur


Respiratory: CTAB, no wheezes, no rales, no ronchi


Gastrointestinal: soft, non-tender, normal bowel sounds


Extremities: no edema





Hosp A/P


(1) Pelvic mass


Code(s): R19.00 - INTRA-ABD AND PELVIC SWELLING, MASS AND LUMP, UNSP SITE   

Status: Acute   


Plan: 


cystoscopy, Bx today








(2) Bacteremia


Code(s): R78.81 - BACTEREMIA   Status: Acute   


Plan: 


1/2 blood C&S pos








(3) DM type 2 (diabetes mellitus, type 2)


Status: Acute   


Qualifiers: 


   Diabetes mellitus long term insulin use: without long term use   Diabetes 

mellitus complication status: with kidney complications 





(4) Metastatic bone cancer


Code(s): C41.9 - MALIGNANT NEOPLASM OF BONE AND ARTICULAR CARTILAGE, UNSP   

Status: Acute   





(5) Hydronephrosis


Code(s): N13.30 - UNSPECIFIED HYDRONEPHROSIS   Status: Acute   


Qualifiers: 


   Hydronephrosis type: unspecified   Qualified Code(s): N13.30 - Unspecified 

hydronephrosis   





(6) HTN (hypertension)


Code(s): I10 - ESSENTIAL (PRIMARY) HYPERTENSION   Status: Chronic   


Qualifiers: 


   Hypertension type: essential hypertension   Qualified Code(s): I10 - 

Essential (primary) hypertension   





- Plan





cystoscopy/ BX today. cont iv antibx pending Sensitivities. monitor renal fcn- 

acute bs CKD

## 2019-07-26 NOTE — PRG
DATE OF SERVICE:  07/26/2019



SUBJECTIVE:  The patient is seen and examined at the bedside.  He noticed swelling

of his testicles.  He had some bowel movement.  His appetite is somewhat better. 



OBJECTIVE:  VITAL SIGNS:  Blood pressure is 167/77, pulse is 74, temperature 99.0,

respiratory rate is 18, O2 saturation 99% on room air. 

HEENT:  Head is atraumatic and normocephalic.  Skin looks palish.  Conjunctivae

palish.  Oral mucosa is moist. 

NECK:  Supple. 

LUNGS:  Clear. 

HEART:  S1 and S2 normal.  No S3.  No S4. 

ABDOMEN:  Soft.  Mildly distended.  No guarding.  No masses. 

EXTREMITIES:  1+ to 2+ peripheral edema.  Scrotum swollen.  Non-tenderness on

palpation. 

NEUROLOGICAL:  He is alert and oriented x4.  There is no any sensory or motor

deficits. 



LABORATORY DATA:  None.



IMPRESSION:  

1. Pelvic mass, status post biopsy awaiting for the pathology report to come back.

2. Bacteremia with Streptococcus agalactiae.  We are going to switch him from Ancef

to Rocephin 2 g every 24 hours. 

3. Diabetes mellitus.

4. Metastatic bone cancer.

5. Hydronephrosis, status post bilateral stent placement by Dr. Zuñiga.

6. Peripheral edema and scrotal swelling.  We will stop his IV fluids and give him 1

dose of Lasix 40 mg IV push x1, and we will monitor this closely.  Awaiting for

pathology report. 







Job ID:  [id number]

## 2019-07-26 NOTE — PRG
DATE OF SERVICE:  07/26/2019



SUBJECTIVE:  The patient states that he is feeling well.  He has no pain, no rectal

bleeding.  He does have worsening overactive bladder with urge incontinence, now

that he has ureteral stents in.  He did undergo bilateral ureteral stenting and

transrectal mass biopsy yesterday. 



OBJECTIVE:  VITAL SIGNS:  Temperature 99, pulse 74, respirations 18, blood pressure

167/77, saturation 99% on room air. 

GENERAL:  No apparent distress, communicative and alert. 

CARDIOVASCULAR:  Regular rate and rhythm. 

ABDOMEN:  Soft, nontender, and nondistended.  Positive bowel sounds. 

:  Worsening scrotal edema with mild dribbling of urine from the penis. 

EXTREMITIES:  2+ edema bilaterally.



ASSESSMENT AND PLAN:  An 78-year-old white male with bilateral hydronephrosis and

pelvic mass causing ureteral obstruction, status post ureteral stenting and pelvic

mass biopsy.  The worsening edema is likely attributable to fluid overload.  I

recommend stopping his IV fluids and the hospitalist has inquired if they can give

him Lasix, which I would agree with.  I would like to check his creatinine tomorrow

to ensure that it is coming down as it should be if the stents are working.  I will

also order a renal ultrasound.  If the ultrasound shows minimal to no hydronephrosis

and the creatinine is improved, from my standpoint, I think the patient can be

discharged home.  If the hydronephrosis is persistent or his creatinine is more

elevated with persistent obstruction, then the patient will likely need to have his

stents removed and go for percutaneous nephrostomy tubes instead.  Per Pathology,

the transrectal biopsy results should be available on Monday.  Dr. Aldana will be

on-call over the weekend and I will sign out to him to check in on ultrasound

report.  Otherwise, from my standpoint, if the patient has improved renal function,

he can be discharged home and I will see him on an outpatient basis in approximately

3 to 4 weeks. 







Job ID:  [id number]

## 2019-07-27 NOTE — PDOC.HOSPP
- Subjective


Subjective: 


79 y/o male with DM, HTN and BPH admitted due to worsening low back pain. 

Evaluation with imaging revealed bulky retroperitoneal adenopathy, pelvic mass 

and bilateral hydronephrosis. No new problem. Concerned about increase in creat 

today.





- Objective


Vital Signs & Weight: 


 Vital Signs (12 hours)











  Temp Pulse Resp BP BP BP BP


 


 07/27/19 17:41  99.7 F H  77  20    161/73 H 


 


 07/27/19 08:34  99.0 F  84  16     166/72 H


 


 07/27/19 08:22     180/86 H  166/72 H  














  Pulse Ox


 


 07/27/19 17:41  94 L


 


 07/27/19 08:34  94 L


 


 07/27/19 08:22 








 Weight











Weight                         205 lb














I&O: 


 











 07/26/19 07/27/19 07/28/19





 06:59 06:59 06:59


 


Intake Total 2240 870 


 


Output Total 325 785 80


 


Balance 1915 85 -80











Result Diagrams: 


 07/24/19 03:59





 07/27/19 05:23





ROS





- Review of Systems


All systems: 


All other ROS were reviewed and found negative.








- Medication


Medications: 


Active Medications











Generic Name Dose Route Start Last Admin





  Trade Name Freq  PRN Reason Stop Dose Admin


 


Acetaminophen  650 mg  07/24/19 02:18  07/24/19 19:45





  Tylenol  PO   650 mg





  Q4H PRN   Administration





  Headache/Fever/Mild Pain (1-3)   





     





     





     


 


Hydrocodone Bitart/Acetaminophen  1 tab  07/24/19 02:23  07/26/19 22:26





  [place] 10/325  PO   1 tab





  Q4H PRN   Administration





  Moderate Pain (4-6)   





     





     





     


 


Coenzyme Q10  100 mg  07/24/19 09:00  07/27/19 09:34





  Coenzyme Q10  PO   100 mg





  DAILY GRACE   Administration





     





     





     





     


 


Famotidine  20 mg  07/24/19 09:00  07/27/19 09:35





  Pepcid  SLOW IVP   20 mg





  DAILY GRACE   Administration





     





     





     





     


 


Guaifenesin  200 mg  07/25/19 05:43  07/26/19 23:27





  Robitussin Sf  PO   200 mg





  Q8H PRN   Administration





  Cough   





     





     





     


 


Ceftriaxone Sodium 2 gm/  100 mls @ 200 mls/hr  07/26/19 12:00  07/27/19 14:29





  Sodium Chloride  IVPB   100 mls





  Q24HR GRACE   Administration





     





     





     





     


 


Mirabegron  50 mg  07/27/19 09:00  07/27/19 09:37





  Myrbetriq Er  PO   50 mg





  DAILY GRACE   Administration





     





     





     





     


 


Oxybutynin Chloride  10 mg  07/26/19 09:00  07/27/19 09:36





  Ditropan Xl  PO   10 mg





  DAILY GRACE   Administration





     





     





     





     


 


Pregabalin  50 mg  07/24/19 09:00  07/27/19 09:35





  Lyrica  PO   50 mg





  QAM GRACE   Administration





     





     





     





     


 


Pregabalin  100 mg  07/24/19 21:00  07/26/19 21:51





  Lyrica  PO   100 mg





  QPM GRACE   Administration





     





     





     





     


 


Senna/Docusate Sodium  2 tab  07/24/19 21:00  07/27/19 09:34





  Senokot S  PO   2 tab





  BID GRACE   Administration





     





     





     





     


 


Simvastatin  10 mg  07/24/19 21:00  07/26/19 21:51





  Zocor  PO   10 mg





  HS GRACE   Administration





     





     





     





     


 


Sodium Biphosphate/Sodium Phosphate  133 ml  07/25/19 08:00  07/26/19 09:19





  Fleet Enema  CT   Not Given





  0800 GRACE   





     





     





     





     


 


Sodium Chloride  10 ml  07/24/19 09:00  07/26/19 21:56





  Flush - Normal Saline  IVF   10 ml





  Q12HR GRACE   Administration





     





     





     





     














- Exam


awake alert


Eye: PERRL, anicteric sclera


ENT: normocephalic atraumatic


Neck: supple, symmetric, no JVD


Heart: RRR


Respiratory: CTAB


Gastrointestinal: soft, non-tender, non-distended, normal bowel sounds


Extremities: no cyanosis, 2+ LE edema


Neurological: CN's grossly intact, no focal deficits


Psychiatric: normal affect, A&O x 3





Hosp A/P


(1) FRANCISCO JAVIER (acute kidney injury)


Code(s): N17.9 - ACUTE KIDNEY FAILURE, UNSPECIFIED   Status: Acute   





(2) Obstructive uropathy


Code(s): N13.9 - OBSTRUCTIVE AND REFLUX UROPATHY, UNSPECIFIED   Status: Acute   





(3) Bilateral hydronephrosis


Code(s): N13.30 - UNSPECIFIED HYDRONEPHROSIS   Status: Acute   





(4) BPH (benign prostatic hyperplasia)


Code(s): N40.0 - BENIGN PROSTATIC HYPERPLASIA WITHOUT LOWER URINRY TRACT SYMP   

Status: Acute   





(5) Bilateral leg edema


Code(s): R60.0 - LOCALIZED EDEMA   Status: Acute   





(6) DM type 2 (diabetes mellitus, type 2)


Status: Acute   


Qualifiers: 


   Diabetes mellitus long term insulin use: without long term use   Diabetes 

mellitus complication status: with kidney complications 





(7) Metastatic bone cancer


Code(s): C41.9 - MALIGNANT NEOPLASM OF BONE AND ARTICULAR CARTILAGE, UNSP   

Status: Acute   





(8) Pelvic mass


Code(s): R19.00 - INTRA-ABD AND PELVIC SWELLING, MASS AND LUMP, UNSP SITE   

Status: Acute   





(9) HTN (hypertension)


Code(s): I10 - ESSENTIAL (PRIMARY) HYPERTENSION   Status: Chronic   


Qualifiers: 


   Hypertension type: essential hypertension   Qualified Code(s): I10 - 

Essential (primary) hypertension   





- Plan


Get Repeat Renal US given recent stent placement and worsening renal function


Also DC HCTZ and losartan.


increase amlodipine to 10 mg daily


Get bilateral lower extremity dopoplers for DVT


Urology following anad Percutanoeous nephrostom=y is contemplated if 

hydronephrosis persisted.


get urine electrolytes


Care plan discussed with patient and relatives.


We will follow ordered disgnostic tests.


Follow renal function.

## 2019-07-27 NOTE — PRG
DATE OF SERVICE:  07/27/2019



SUBJECTIVE:  The patient is having urinary urgency, continues to have back pain, but

no new problems. 



OBJECTIVE:  VITAL SIGNS:  Blood pressure 166/72, temperature 99, O2 saturation 94%

on room air, and pulse 84. 

ABDOMEN:  Soft and nontender.  No palpable masses.  Liver and spleen, not palpable.

No abdominal tenderness noted.  Urine output yesterday 325 mL. 



LABORATORY DATA:  Creatinine on 07/27 equals 2.58.  Creatinine on 07/23 equals 1.48.



IMPRESSION:  Mr. Kilpatrick is an 78-year-old gentleman, status post retrograde

pyelography and stent placement on 07/25/2019, done in conjunction with transrectal

biopsy of a pelvic mass.  He presented with a pelvic mass and bilateral ureteral

obstruction.  His creatinine was elevated, so stents were placed.  Unfortunately,

they are not functioning well and in fact, his renal function has worsened based on

his creatinine measurement.  Renal ultrasound has been done this morning, but has

not been read.  I have contacted the radiologist, Dr. Baron, who will contact me

once the ultrasound has been read.  If ultrasound confirms unresolved or worsening

hydronephrosis, bilateral percutaneous nephrostomy tubes will be necessary.  The

patient is aware of this as our family members. 



PLAN:  Bilateral percutaneous nephrostomy tubes for relief of bilateral ureteral

obstruction, unrelieved by ureteral stents.  Pending confirmation of unresolved or

worsening hydronephrosis on ultrasound harman harris 







Job ID:  [id number]

## 2019-07-27 NOTE — ULT
Exam: Bilateral renal ultrasound



HISTORY: Hydronephrosis



COMPARISON: CT of the chest, abdomen and pelvis dated July 23, 2019



FINDINGS: 

Right kidney: There is moderate right-sided hydronephrosis that does not appreciably changed from the
 comparison study. No right-sided ureteral jet is identified at the level of the bladder. The right

ureteral stent is not definitely seen.

Right kidney measurements: 11.2 x 6.3 x 6.5 cm

Left kidney: The dilatation of the left renal collecting system is reduced from the comparison CT exa
mination. There is partial visualization of the distal left ureteral stent and left ureteral jet at

the level of the bladder. Mild amount of residual hydronephrosis remains within the left renal collec
ting system.

Left kidney measurements: 13.6 x 7.8 x 5.8 cm.



Urinary bladder: There is a left ureteral stent and left ureteral jet partially visualized. Bulky mas
s at the bladder base is present in the region of the prostate measuring 9.8 x 8.3 cm.





IMPRESSION: 

1. Moderate right hydronephrosis is not appreciably changed from the comparison CT examination follow
ing placement of a right ureteral stent.

2. Left ureteral stent is partially visualized within the lower bladder with associated left ureteral
 jet. There is mild residual left-sided hydronephrosis. This is improved from the prior CT

evaluation.

3. Bulky lower pelvic mass possibly related to prominent lymphadenopathy or prostate enlargement.

4. Findings called to Dr. Aldana at 10:30 AM on July 27, 2019.



Reported By: Geovany Juárez 

Electronically Signed:  7/27/2019 10:54 AM

## 2019-07-27 NOTE — ULT
Ultrasound guidance for nephrostomy tube placement



INDICATION: Moderate right and mild left hydronephrosis due to distal obstruction.



TECHNIQUE: Ultrasound was utilized for percutaneous nephrostomy tube placement within the right renal
 collecting system. Please see the fluoroscopic nephrostomy procedure note for full details

concerning the procedure.



FINDINGS: There is mild left and moderate right hydronephrosis involving the kidneys.



IMPRESSION: Ultrasound guidance utilized for percutaneous access of the right renal collecting system
.



Reported By: Geovany Juárez 

Electronically Signed:  7/27/2019 5:32 PM

## 2019-07-28 NOTE — ULT
BILATERAL LOWER EXTREMITY VENOUS DOPPLER ULTRASOUND:

 

Date:  07/28/19 

 

HISTORY:  

Bilateral lower extremity edema. 

 

TECHNIQUE:  

Gray scale ultrasound with color flow and spectral Doppler imaging of the deep venous systems of the 
lower extremities was performed bilaterally. 

 

FINDINGS:

There is good flow, compression, and augmentation noted in the common femoral, femoral, deep femoral,
 popliteal, posterior tibial, and greater saphenous veins on either side. 

 

IMPRESSION: 

No evidence of deep venous thrombosis in either lower extremity.  

 

POS: MAXIMO

## 2019-07-28 NOTE — PDOC.HOSPP
- Subjective


Subjective: 


79 y/o male with DM, HTN and BPH admitted due to worsening low back pain. 

Evaluation with imaging revealed bulky retroperitoneal adenopathy, pelvic mass 

and bilateral hydronephrosis. S/p cystoscopy with bilateral stent placement. 

However found to have persistent of right hydronephrosis hence right 

percutaneous nephrostomy tube placement. Had minimal out put from the uretrhra 

and bladder scan showed >300 urine. still having back pain but denied fever, 

chest pain or SOB. Oral intake is poor. 








- Objective


Vital Signs & Weight: 


 Vital Signs (12 hours)











  Temp Pulse Resp BP BP Pulse Ox


 


 07/28/19 09:09   78   167/75 H  


 


 07/28/19 07:40  98.8 F  78  16   167/75 H  94 L


 


 07/28/19 03:29  98.5 F  79  16   159/74 H  95


 


 07/28/19 00:00  98.5 F  77  16   166/79 H  95








 Weight











Weight                         205 lb














I&O: 


 











 07/27/19 07/28/19 07/29/19





 06:59 06:59 06:59


 


Intake Total 870  


 


Output Total 785 2880 1750


 


Balance 85 -2880 -1750











Result Diagrams: 


 07/24/19 03:59





 07/28/19 03:49





ROS





- Review of Systems


All systems: 


All other ROS were reviewed and found negative.








- Medication


Medications: 


Active Medications











Generic Name Dose Route Start Last Admin





  Trade Name Freq  PRN Reason Stop Dose Admin


 


Acetaminophen  650 mg  07/24/19 02:18  07/24/19 19:45





  Tylenol  PO   650 mg





  Q4H PRN   Administration





  Headache/Fever/Mild Pain (1-3)   





     





     





     


 


Hydrocodone Bitart/Acetaminophen  1 tab  07/24/19 02:23  07/28/19 07:54





  [place] 10/325  PO   1 tab





  Q4H PRN   Administration





  Moderate Pain (4-6)   





     





     





     


 


Amlodipine Besylate  10 mg  07/28/19 09:00  07/28/19 09:09





  Norvasc  PO   10 mg





  DAILY GRACE   Administration





     





     





     





     


 


Coenzyme Q10  100 mg  07/24/19 09:00  07/28/19 09:09





  Coenzyme Q10  PO   Not Given





  DAILY GRACE   





     





     





     





     


 


Famotidine  20 mg  07/24/19 09:00  07/28/19 09:11





  Pepcid  SLOW IVP   20 mg





  DAILY GRACE   Administration





     





     





     





     


 


Guaifenesin  200 mg  07/25/19 05:43  07/26/19 23:27





  Robitussin Sf  PO   200 mg





  Q8H PRN   Administration





  Cough   





     





     





     


 


Ceftriaxone Sodium 2 gm/  100 mls @ 200 mls/hr  07/26/19 12:00  07/27/19 14:29





  Sodium Chloride  IVPB   100 mls





  Q24HR GRACE   Administration





     





     





     





     


 


Mirabegron  50 mg  07/27/19 09:00  07/28/19 09:08





  Myrbetriq Er  PO   50 mg





  DAILY GRACE   Administration





     





     





     





     


 


Oxybutynin Chloride  10 mg  07/26/19 09:00  07/28/19 09:09





  Ditropan Xl  PO   10 mg





  DAILY GRACE   Administration





     





     





     





     


 


Pregabalin  50 mg  07/24/19 09:00  07/28/19 09:10





  Lyrica  PO   50 mg





  QAM GRACE   Administration





     





     





     





     


 


Pregabalin  100 mg  07/24/19 21:00  07/27/19 21:11





  Lyrica  PO   100 mg





  QPM GRACE   Administration





     





     





     





     


 


Senna/Docusate Sodium  2 tab  07/24/19 21:00  07/28/19 09:10





  Senokot S  PO   2 tab





  BID GRACE   Administration





     





     





     





     


 


Simvastatin  10 mg  07/24/19 21:00  07/27/19 21:10





  Zocor  PO   10 mg





  HS GRACE   Administration





     





     





     





     


 


Sodium Biphosphate/Sodium Phosphate  133 ml  07/25/19 08:00  07/28/19 10:21





  Fleet Enema  MA   133 ml





  0800 GRACE   Administration





     





     





     





     


 


Sodium Chloride  10 ml  07/24/19 09:00  07/28/19 09:16





  Flush - Normal Saline  IVF   10 ml





  Q12HR GRACE   Administration





     





     





     





     














- Exam


awake alert


Eye: PERRL, anicteric sclera


ENT: normocephalic atraumatic


Neck: no JVD


Heart: RRR


Respiratory: CTAB


Gastrointestinal: soft, non-tender, non-distended, normal bowel sounds


Extremities: 2+ LE edema


Neurological: CN's grossly intact, no focal deficits


Psychiatric: normal affect, A&O x 3





Hosp A/P


(1) FRANCISCO JAVIER (acute kidney injury)


Code(s): N17.9 - ACUTE KIDNEY FAILURE, UNSPECIFIED   Status: Acute   





(2) Obstructive uropathy


Code(s): N13.9 - OBSTRUCTIVE AND REFLUX UROPATHY, UNSPECIFIED   Status: Acute   





(3) Bilateral hydronephrosis


Code(s): N13.30 - UNSPECIFIED HYDRONEPHROSIS   Status: Acute   





(4) BPH (benign prostatic hyperplasia)


Code(s): N40.0 - BENIGN PROSTATIC HYPERPLASIA WITHOUT LOWER URINRY TRACT SYMP   

Status: Acute   





(5) Bilateral leg edema


Code(s): R60.0 - LOCALIZED EDEMA   Status: Acute   





(6) DM type 2 (diabetes mellitus, type 2)


Status: Acute   


Qualifiers: 


   Diabetes mellitus long term insulin use: without long term use   Diabetes 

mellitus complication status: with kidney complications 





(7) Metastatic bone cancer


Code(s): C41.9 - MALIGNANT NEOPLASM OF BONE AND ARTICULAR CARTILAGE, UNSP   

Status: Acute   





(8) Pelvic mass


Code(s): R19.00 - INTRA-ABD AND PELVIC SWELLING, MASS AND LUMP, UNSP SITE   

Status: Acute   





(9) HTN (hypertension)


Code(s): I10 - ESSENTIAL (PRIMARY) HYPERTENSION   Status: Chronic   


Qualifiers: 


   Hypertension type: essential hypertension   Qualified Code(s): I10 - 

Essential (primary) hypertension   





(10) Hypokalemia


Code(s): E87.6 - HYPOKALEMIA   Status: Acute   





- Plan


Add coreg to amlodipine to get better BP control.


Start gentle IV hydration given poor oral intake.


We will do straight cath if patient cannot void.


Replete serum potassium.


Monitor renal function and electrolytes.


await bilateral lower extremity doppler

## 2019-07-29 NOTE — PDOC.HOSPP
- Subjective


Subjective: 





Feels fairly well.  Tolerated the Woods placement.  Asked for a break before 

the enema.





- Objective


Vital Signs & Weight: 


 Vital Signs (12 hours)











  Temp Pulse Resp BP BP Pulse Ox


 


 07/29/19 10:07  97.9 F  66  16   178/81 H  94 L


 


 07/29/19 09:43   76   167/75 H  








 Weight











Weight                         205 lb














I&O: 


 











 07/28/19 07/29/19 07/30/19





 06:59 06:59 06:59


 


Intake Total  2550 


 


Output Total 2882 4530 


 


Balance -8657 -0500 











Result Diagrams: 


 07/29/19 04:36





 07/29/19 04:36





ROS





- Review of Systems


All systems: 


All other ROS were reviewed and found negative.








- Medication


Medications: 


Active Medications











Generic Name Dose Route Start Last Admin





  Trade Name Freq  PRN Reason Stop Dose Admin


 


Acetaminophen  650 mg  07/24/19 02:18  07/24/19 19:45





  Tylenol  PO   650 mg





  Q4H PRN   Administration





  Headache/Fever/Mild Pain (1-3)   





     





     





     


 


Hydrocodone Bitart/Acetaminophen  1 tab  07/24/19 02:23  07/29/19 04:16





  Marvell 10/325  PO   1 tab





  Q4H PRN   Administration





  Moderate Pain (4-6)   





     





     





     


 


Amlodipine Besylate  10 mg  07/28/19 09:00  07/29/19 09:43





  Norvasc  PO   10 mg





  DAILY GRACE   Administration





     





     





     





     


 


Carvedilol  6.25 mg  07/28/19 21:00  07/29/19 09:43





  Coreg  PO   6.25 mg





  BID GRACE   Administration





     





     





     





     


 


Coenzyme Q10  100 mg  07/24/19 09:00  07/29/19 09:42





  Coenzyme Q10  PO   100 mg





  DAILY GRACE   Administration





     





     





     





     


 


Famotidine  20 mg  07/24/19 09:00  07/29/19 09:45





  Pepcid  SLOW IVP   20 mg





  DAILY GRACE   Administration





     





     





     





     


 


Guaifenesin  200 mg  07/25/19 05:43  07/28/19 21:17





  Robitussin Sf  PO   200 mg





  Q8H PRN   Administration





  Cough   





     





     





     


 


Ceftriaxone Sodium 2 gm/  100 mls @ 200 mls/hr  07/26/19 12:00  07/28/19 12:31





  Sodium Chloride  IVPB   100 mls





  Q24HR GRACE   Administration





     





     





     





     


 


Lactated Ringer's  1,000 mls @ 50 mls/hr  07/28/19 11:15  07/29/19 09:41





  Lactated Ringer's  IV   1,000 mls





  .Q20H GRACE   Administration





     





     





     





     


 


Pregabalin  50 mg  07/24/19 09:00  07/29/19 09:44





  Lyrica  PO   50 mg





  QAM GRACE   Administration





     





     





     





     


 


Pregabalin  100 mg  07/24/19 21:00  07/28/19 21:13





  Lyrica  PO   100 mg





  QPM GRACE   Administration





     





     





     





     


 


Senna/Docusate Sodium  2 tab  07/24/19 21:00  07/29/19 09:43





  Senokot S  PO   2 tab





  BID GRACE   Administration





     





     





     





     


 


Simvastatin  10 mg  07/24/19 21:00  07/28/19 21:13





  Zocor  PO   10 mg





  HS GRACE   Administration





     





     





     





     


 


Sodium Biphosphate/Sodium Phosphate  133 ml  07/25/19 08:00  07/28/19 10:21





  Fleet Enema  NH   133 ml





  0800 GRACE   Administration





     





     





     





     


 


Sodium Chloride  10 ml  07/24/19 09:00  07/28/19 21:00





  Flush - Normal Saline  IVF   Not Given





  Q12HR GRACE   





     





     





     





     


 


Sodium Chloride  10 ml  07/24/19 00:39  07/28/19 12:32





  Flush - Normal Saline  IVF   10 ml





  PRN PRN   Administration





  Saline Flush   





     





     





     














- Exam


NAD


Heart: RRR, no murmur, no gallops, no rubs, normal peripheral pulses


Respiratory: CTAB, no wheezes, no rales, no ronchi, normal chest expansion, no 

tachypnea, normal percussion


Gastrointestinal: soft, non-tender, non-distended, normal bowel sounds, no 

palpable masses, no hepatomegaly, no splenomegaly, no bruit


Extremities: no cyanosis, no clubbing, no edema


Skin: normal turgor, no lesions, no rashes


Neurological: CN's grossly intact, normal sensation to touch, no weakness, no 

focal deficits, no new deficit





Hosp A/P


(1) FRANCISCO JAVIER (acute kidney injury)


Code(s): N17.9 - ACUTE KIDNEY FAILURE, UNSPECIFIED   Status: Acute   





(2) Bilateral hydronephrosis


Code(s): N13.30 - UNSPECIFIED HYDRONEPHROSIS   Status: Acute   





(3) Bilateral leg edema


Code(s): R60.0 - LOCALIZED EDEMA   Status: Acute   





(4) DM type 2 (diabetes mellitus, type 2)


Status: Acute   


Qualifiers: 


   Diabetes mellitus long term insulin use: without long term use   Diabetes 

mellitus complication status: with kidney complications 





(5) Hydronephrosis


Code(s): N13.30 - UNSPECIFIED HYDRONEPHROSIS   Status: Acute   


Qualifiers: 


   Hydronephrosis type: unspecified   Qualified Code(s): N13.30 - Unspecified 

hydronephrosis   





(6) Metastatic bone cancer


Code(s): C41.9 - MALIGNANT NEOPLASM OF BONE AND ARTICULAR CARTILAGE, UNSP   

Status: Acute   





(7) Pelvic mass


Code(s): R19.00 - INTRA-ABD AND PELVIC SWELLING, MASS AND LUMP, UNSP SITE   

Status: Acute   





(8) HTN (hypertension)


Code(s): I10 - ESSENTIAL (PRIMARY) HYPERTENSION   Status: Chronic   


Qualifiers: 


   Hypertension type: essential hypertension   Qualified Code(s): I10 - 

Essential (primary) hypertension   





- Plan





Long discussion with the patient and his family.  Discussed the plan.


Will work on BM today.


Consider DC tomorrow.


Awaiting path.


[facility] DC [facility]


Will need Woods for a week or more and the Perc drain longer.


Still on Rocephin.  No evidence of infection.  May be contaminate culture.

## 2019-07-31 NOTE — CON
DATE OF CONSULTATION:  07/30/2019



HISTORY OF PRESENT ILLNESS:  Mr. Kilpatrick is an 78-year-old male with no significant

past medical history, who noticed about 2 months ago that he was developing

increasing problems with urination as well as some back pain.  The back pain got

exquisitely worse 1 week prior to the admission, and he presented to Urgent Care and

was diagnosed with sciatica.  He then went to his primary care physician.  An MRI

showed no significant lesions in the bones, but did show bilateral hydronephrosis

and lymphadenopathy in the retroperitoneum.  He was then admitted because of the

hydronephrosis and acute renal failure.  Cystoscopy done on July 16, 2019, showed no

evidence of mass in the bladder or prostate, but there was some external compression

on the bladder and transrectal biopsy was done.  Bilateral stents were attempted.

He did eventually require a right percutaneous nephrostomy tube as well.  His

creatinine has resolved.  He has had some neurogenic bladder and still has a Woods

catheter in place.  His back pain is much better since the treatment of the

hydronephrosis.  The pathology is back as metastatic carcinoma, possibly a prostate

cancer primary.  There was some concern for lymphoma, but this was ruled out with a

biopsy.  He is feeling much better, but is quite tired.  He denies shortness of

breath or significant back pain.  He was quite active and in fact was out of town on

[place] when the [place] pain started. 



PAST MEDICAL HISTORY:  

1. Hypertension.

2. Type 2 diabetes, well controlled.

3. Benign prostatic hypertrophy, he states in the past his PSA has been less than 1.

4. Hypercholesterolemia.

5. Neuropathy of unknown etiology.



PAST SURGICAL HISTORY:  

1. Appendectomy.

2. Right Achilles tendon repair.

3. Inguinal hernia repair.

4. Cholecystectomy.

5. Right shoulder surgery.

6. Bilateral cataract surgery.



CURRENT MEDICATIONS:  

1. Tylenol p.r.n.

2. Norco p.r.n.

3. Norvasc 10 mg p.o. daily.

4. Dulcolax 10 mg p.o. daily p.r.n.

5. Coreg 6.25 mg p.o. b.i.d.

6. Ceftriaxone given after the bladder biopsy.

7. Coenzyme Q10.

8. Drisdol 1.25 mg p.o. q.30 days.

9. Pepcid 20 mg IV daily.

10. Glucagon as needed.

11. Guaifenesin as needed.

12. Morphine 2 mg IV q.6 hours p.r.n.

13. Lyrica 50 mg p.o. q.a.m. and 100 mg p.o. q.p.m.

14. Senokot two tablets p.o. b.i.d.

15. Zocor 10 mg p.o. at bedtime.

16. Fleet enema as needed, although he did have a bowel movement on the day of the

consult. 

17. Flomax 0.4 mg p.o. at bedtime.



ALLERGIES:  PENICILLIN AND SULFA.



SOCIAL HISTORY:  He lives in [place] with his wife, who is quite supportive.

They have 7 children between them and one of them is a nurse in the [employer] system.

There is a quite supportive family with him.  He denies anything other than

occasional alcohol use.  Denies significant tobacco use. 



REVIEW OF SYSTEMS:  Otherwise, a 10-point review of systems is negative with the

exception of constipation and some pain in the bottoms of his feet, which is chronic

from neuropathy. 



PHYSICAL EXAMINATION:

VITAL SIGNS:  Temperature 98.4, pulse 65, O2 saturation 95% on room air,

respirations 18, and blood pressure 166/77. 

GENERAL:  He is alert, awake, oriented x3, is in no acute distress, quite pleasant,

give a great history. 

HEENT:  Extraocular muscles are intact.  Pupils are equal, round and reactive to

light.  He has no oral cavity lesions. 

NECK:  Supple without lymphadenopathy. 

CARDIOVASCULAR:  Regular rhythm. 

LUNGS:  Clear to auscultation bilaterally. 

ABDOMEN:  Hypoactive bowel sounds.  Soft and nontender, but with some tenderness in

the right lower quadrant near the bladder. 

EXTREMITIES:  No edema.  No clubbing.



LABORATORY DATA:  White blood cell count 5.9, hemoglobin 10.1, platelets 296.  PSA

38, CA-19-9 of 58, CEA is 3.  Creatinine was 2.58 on admission, is down to 0.9 just

prior to discharge.  AST 13, ALT 17, alkaline phosphatase 219, albumin 3.5.  BNP

477. 



IMAGING STUDIES:  CT scan of the chest, abdomen, and pelvis done on July 23, 2019,

shows the lung fields have chronic parenchymal changes, but no effusion or

infiltrate.  No pulmonary masses.  The liver, spleen, and pancreas are unremarkable.

 Both kidneys showed mild bilateral hydronephrosis with evidence of bilateral

ureteral obstruction in the pelvis due to adenopathy.  The urinary bladder shows

abnormal soft tissue mass invading the floor of the bladder involving both UVJ

regions.  The impression is that there is a large lobulated soft tissue mass in the

pelvis involving the floor of the bladder.  There is bulky confluent adenopathy

throughout the retroperitoneum consistent with possibly a primary bladder malignancy

with associated adenopathy, lymphoma with also a consideration. 



Radiology; biopsy of the pelvic mass showed metastatic poorly differentiated

neoplasm with pending immunohistochemical stains.  The prostate markers show some

weak focal reactivity and metastatic prostate carcinoma is in the differential, but

further stains are pending. 



ASSESSMENT:  Mr. Kilpatrick is an 78-year-old male with:

1. Bulky lymphadenopathy in the pelvis causing bilateral ureteral obstruction.

2. Acute renal failure secondary to above, now resolving with right percutaneous

nephrostomy tube in place as well as ureteral stent in place. 

3. Elevated PSA.

4. Neuropathy.



PLAN:  

1. I discussed with them the diagnosis as we know it.  I would recommend discharge

today with a PET scan or bone scan as an outpatient as well as an MRI of the brain.

It is possible we will need to have the pathology reviewed at [facility] and we did

discuss this as well. 

2. I would recommend follow up with Urology within the next week.  Hopefully, we can

get the catheter out. 

3. If this does turn out to be prostate cancer, I would recommend starting Lupron

and probably Taxotere.  We did discuss the second opinion at [facility] and I can

discuss this more with him as an outpatient. 

4. Norco as needed, I will write this prescription.

5. He will follow up with me as an outpatient.







Job ID:  [id number]

## 2019-07-31 NOTE — DIS
DATE OF ADMISSION:  07/24/2019



DATE OF DISCHARGE:  07/30/2019



DISCHARGE DIAGNOSES:  

1. 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

2. Metastatic disease to the bone.

3. Acute kidney injury.

4. Bilateral hydronephrosis.

5. Urinary retention.

6. Hypertension.

7. Bilateral lower extremity edema.

8. Type 2 diabetes mellitus.



HISTORY OF PRESENT ILLNESS:  This patient is an 78-year-old male, who reported 
he

had been having some intermittent swelling of his lower extremities and 
ultimately

some swelling in his scrotal area and then had onset of significant back pain,

weakness, and some urinary incontinence, so his primary care provider referred 
to

Dr. Zuñiga.  Had an MRI of the lumbar spine, which revealed a bulky 
retroperitoneal

adenopathy with evidence of metastatic disease to the spine.  Also had ureteral

obstruction, but no central canal stenosis.  Subsequently, he had a CT, which

confirmed bilateral hydronephrosis and a soft tissue mass invading the floor of 
the

bladder that was 10 x 9 cm.  The patient was subsequently admitted to the 
hospital,

and he was seen promptly in consultation by Dr. Zuñiga, who placed bilateral

ureteral stents.  The left ureteral stent appeared to be functioning normally;

however, the right ureteral stent was not relieving the hydronephrosis, 
therefore a

percutaneous drain was placed and the patient had the Woods catheter in place as

well.  He had a bladder mass biopsy performed at that time as well.  He had 
blood

pressure medications added to his regimen due to poorly controlled hypertension,

these included amlodipine with the cessation of hydrochlorothiazide and 
losartan in

light of his worsening renal function.  Lower extremity Dopplers were obtained, 
and

I saw no evidence of lower extremity DVTs to account for the patient's lower

extremity edema.  Once the patient was comfortable with his Woods catheter and 
the

percutaneous drain as well as management of those things, his labs indicated an

improvement of his renal function with his GFR improving from a low of 24 up to 
77

prior to discharge.  The patient's CEA returned at 3.07 and PSA was 38.38.  With

that, the patient was felt to have adequate pain control, adequate improvement 
of

his renal function with drainage of the hydronephrosis and was comfortable with

management of the drains that he could be discharged to home to have the 
remainder

of his workup and treatment performed on an outpatient basis.  Dr. Darby saw 
the

patient in the hospital prior to discharge.  She discussed the known results of 
the

pathology and the treatment plan based on the finalization. 



PHYSICAL EXAMINATION:

VITAL SIGNS:  On the day of discharge, temperature is 98.4, pulse 65, 
respirations

18, O2 saturation 95% on room air, BP was 166/77. 

GENERAL:  The patient was awake and alert, sitting up in a chair. 

HEART:  Regular rate and rhythm. 

LUNGS:  Clear bilaterally. 

ABDOMEN:  Benign. 

EXTREMITIES:  Showed trace to 1+ pitting edema.



DISPOSITION:  The patient is discharged to home in stable condition.



ACTIVITY:  As tolerated.



DIET:  He will remain on a heart-healthy diet.



MEDICATIONS:  Include;

1. Amlodipine 10 mg p.o. daily.

2. Carvedilol 6.25 mg b.i.d.

3. Norco p.r.n.

4. Senokot b.i.d.

5. Tamsulosin 0.4 mg at bedtime. 

He will continue with;

1. Diovan HCT.

2. CoQ10.

3. Tadalafil.

4. Selenium.

5. Lyrica.

6. Multivitamin.

7. Glucosamine.

8. Flonase.

9. Esomeprazole.

10. Calcium.

11. Aspirin.

12. Lovastatin.



FOLLOWUP:  He will follow up with Dr. Darby.  He is to return to the hospital

should he have any problems prior to that time.  He will also see Dr. Zuñiga 
and

Dr. Max. 



Time spent in discharge activities, including face to face time with patient, 
was 41 min.



Job ID:  [id number]



MTDD

## 2019-07-31 NOTE — PRG
DATE OF SERVICE:  07/30/2019



SUBJECTIVE:  The patient states he is feeling okay.  He is not having any bladder

spasms.  He is not having any significant flank pain.  His creatinine has almost

gone back down to normal after his nephrostomy tube placement.  Pathology is still

pending, although it does appear to be some poorly differentiated carcinoma. 



OBJECTIVE:  VITAL SIGNS:  Temperature 98.4, pulse 65, respirations 18, blood

pressure 166/77, saturation 95% on room air. 

GENERAL:  No apparent distress, communicative and alert. 

CARDIOVASCULAR:  Regular rate and rhythm. 

ABDOMEN:  Soft, nontender, and nondistended.  Positive bowel sounds. 

:  Woods catheter in place with clear urine.  Ester colored urine in the bag.

Right-sided nephrostomy tube in place on the back. 

EXTREMITIES:  2+ edema bilaterally.  There is also scrotal edema present.



LABORATORY EVALUATION:  The full set of labs are in the [facility] system, which I

have reviewed.  No new labs from a CBC.  Creatinine was 0.94 when last checked on

the 29th.  Pathology demonstrates poorly differentiated carcinoma, waiting final

stains. 



ASSESSMENT AND PLAN:  An 78-year-old white male with metastatic carcinoma of unknown

origin, currently with hydronephrosis on the right, treated with a percutaneous

nephrostomy tube.  Left-sided hydronephrosis is drained by a stent.  He does not

need the right-sided ureteral stent, so we will plan to remove this in the office in

a few weeks.  We will await the final pathology and then make further decisions

regarding his cancer.  We will perform a void trial for his bladder at the time of

his stent removal and he should remain on Flomax until then. 







Job ID:  [id number]

## 2019-08-01 NOTE — PQF
[employer] LATISHA Mann ROBIN MD

[id number]                                                             ONC-133

[id number]                             

                                   

CLINICAL DOCUMENTATION CLARIFICATION FORM:  POST DISCHARGE



Addendum to original discharge summary date:  __________________________________
____



Late entry note date:  _________________________________________________________
__











DATE: 08/01/2019                                            ATTN:  SHAHRAM HAIR MD



Please exercise your independent, professional judgment in responding to the 
clarification form. 

Clinical indicators are provided on the bottom of this form for your review



Final Diagnosis on the Pathology report:  Metastatic high grade carcinoma in 
the bladder 



Progress Notes indicate: Pelvic mass



Clarification of Pathology report:

Please check appropriate box(s): 

 [  ] Agree w the pathology finding of: Metastatic high grade carcinoma-

 [  ] Other explanation of pathology findings (please specify)

 [X] Other diagnosis __metastatic high grade prostate cancer__

 [  ] Unable to determine



For continuity of documentation, please document condition throughout progress 
notes and discharge summary.  Thank You.





CLINICAL INDICATORS - SIGNS/ SYMPTOMS / LABS

-Pelvic mass, s/p biopsy -Progress note, pg1, 07/26, Wilfrid Hughes MD

-Metastatic high grade carcinoma, immunohistochemically consistent with 
prostatic adenocarcinoma-Pathology report, 07/30

-Metastatic carcinoma of unknown origin-Progress note, pg1, 07/30,  SHAHRAM HAIR MD

-Pelvic mass with bilateral hydronephrosis-OP report, pg1, 07/25,  SHAHRAM HAIR MD

-The bladder mass is producing bilateral ureteral obstruction with bilateral 
hydronephrosis-Consult, 07/24,  SHAHRAM HAIR MD

RISK FACTORS

-Large bladder mass with bone and lymphnode mass-Consult, 07/24, Melissa FARAH 

-FRANCISCO JAVIER, mild hdyronephrosis bilaterally-Progress note, pg1, Wilfrid Hughes MD



TREATMENTS

-Treated wtih a Percutaneous nephrostomy tube-Progress note, pg1, 07/30,  
SHAHRAM HAIR MD

-Left sided hydronephrosis is drained by a stent-Progress note, pg1, 07/30,  
SHAHRAM HAIR MD







[employer]

2015 [employer].  All Rights Reserved

Ramze Enamorado    [not provided]    [not provided]

                                                              



 (This form is maintained as a part of the permanent medical record)



[facility]

## 2019-08-05 NOTE — EKG
Test Reason : ER INDICATION

Blood Pressure : ***/*** mmHG

Vent. Rate : 073 BPM     Atrial Rate : 073 BPM

   P-R Int : 208 ms          QRS Dur : 084 ms

    QT Int : 368 ms       P-R-T Axes : 053 006 023 degrees

   QTc Int : 405 ms

 

Normal sinus rhythm

Normal ECG

 

Confirmed by RIP BABB ([id number]),  ZAKIA WILKERSON ([id number]) on 8/5/2019 4:49:52 PM

 

Referred By:             Confirmed By:RIP BABB

## 2019-08-06 NOTE — MRI
MRI BRAIN WITH AND WITHOUT CONTRAST:

 

Date:  08/06/19 

 

INDICATION:

Prostate cancer, bone metastasis. Assess for brain metastasis. 

 

No comparison. 

 

FINDINGS:

Ventricles have normal size and position. Mild chronic ischemic white matter changes. No evidence of 
restricted diffusion. No evidence of mass or edema on FLAIR sequence. 

 

No evidence of abnormal enhancement. 

 

The intracranial internal carotid arteries and cerebral arteries show expected flow-voids. Paranasal 
sinuses appear clear. Mild mucosal edema in the left mastoid air cells. 

 

IMPRESSION: 

Mild chronic ischemic white matter change consistent with age. No evidence of metastatic lesion ident
ified. 

 

 

POS: [facility]

## 2019-08-06 NOTE — NM
WHOLE BODY BONE SCAN:

 

HISTORY:

Prostate cancer.

 

RADIOPHARMACEUTICAL:

Technetium 99m MDP 33 millicuries injected intravenously.

 

FINDINGS:

There are foci of increased uptake in the thoracolumbar spine, both scapulae, the pelvis, the right p
roximal femur, the right upper ribs (likely 1 and 2), and in the right 12th rib, consistent with meta
static disease.

 

Foci of increased uptake in the right and left mandible, which may be due to periodontal disease or m
etastatic disease.  Trace excretion through the kidneys is within normal limits.

 

IMPRESSION:

Osseous metastatic disease.

 

POS: [facility]

## 2019-08-22 NOTE — CT
CT Brain WO Con:



8/22/2019 12:00 AM



CLINICAL HISTORY: Fever and confusion.



IMAGING TECHNIQUE: Multiple CT images were obtained of the brain without IV contrast.



COMPARISON: MR the brain with and without contrast dated August 6, 2019



FINDINGS:

Infarct:  No acute infarct is evident. There is mild chronic small vessel white matter ischemic edgar
e

Hemorrhage: None..

Hydrocephalus: None..

Basal cisterns: Normal..

Cerebral parenchyma: Normal..

Midline shift: None..

Cerebellum: Normal.

Brainstem: Normal.



OTHER:



Calvarium: Intact..

Visualized Paranasal sinuses: Clear..

Extracranial soft tissues:Normal.



IMPRESSION:



No acute intracranial abnormality. 



Reported By: Geovany Juárez 

Electronically Signed:  8/22/2019 9:03 PM

## 2019-08-22 NOTE — RAD
FRONTAL RADIOGRAPH CHEST:

8/22/19

 

COMPARISON: 

2/6/19

 

HISTORY: 

Fever. 

 

FINDINGS: 

There is stable prominence of the cardiac silhouette. Descending thoracic aorta is tortuous/prominent
. There is atherosclerotic calcification of the aortic arch. There is no pneumothorax, lobar consolid
ation, or alveolar edema. 

 

A hiatal hernia is again noted. 

 

IMPRESSION: 

No acute findings-stable appearance of the chest. 

 

POS: [facility]

## 2019-08-23 NOTE — CON
DATE OF CONSULTATION:  08/23/2019



CONSULTING:  [facility].



CONSULTED:  Dr. Zuñiga.



REASON FOR CONSULTATION:  Possible urosepsis with UTI.



HISTORY OF PRESENT ILLNESS:  Mr. Kilpatrick is an 78-year-old white male, who presented

to the ER today with history of a fever up to 101.4 with generalized weakness.  He

has a known history of metastatic prostate cancer, which is currently presenting

with a large pelvic mass pressing on the bladder as well as having retroperitoneal

abdominal lymphadenopathy.  The patient has a nephrostomy tube on the right and a

ureteral stent on the left, which had been placed by me previously secondary to

obstruction from this large cancer.  He is already started on Taxotere with Dr. Darby and received Neulasta patch at the same time.  He had had urinary retention

after his last discharge and was currently on CIC, but had resumed most of his

normal voiding with occasional need to catheterize about once a day.  The wife did

show me a log of catheterization residuals, which shows that he is pretty much

emptying most of his bladder out almost every time, although he still continues to

have residuals anywhere between 100 mL to 200 mL at a time.  The patient does have a

known obstructive prostate gland with a known history of prostate cancer.  He denies

any significant malaise, right flank pain, or left flank pain.  Denies any

significant hematuria or bladder pain other than the chronic indwelling stent

discomfort.  He does not necessarily feel ill at the current time. 



ALLERGIES:  

1. PENICILLIN.

2. SULFA.



HOME MEDICATIONS:  

1. Tylenol.

2. Norco.

3. Norvasc.

4. Dulcolax p.r.n.

5. Coreg.

6. Coenzyme Q10.

7. Drisdol.

8. Pepcid.

9. Guaifenesin.

10. Lyrica.

11. Senokot.

12. Zocor.

13. Taxotere.

14. Neulasta.

15. Flomax.



PAST MEDICAL HISTORY:  

1. Hypertension.

2. Type 2 diabetes.

3. BPH.

4. High cholesterol.

5. Neuropathy.

6. Metastatic prostate cancer.



PAST SURGICAL HISTORY:  

1. Appendectomy.

2. Right Achilles tendon repair.

3. Inguinal hernia repair.

4. Cholecystectomy.

5. Right shoulder surgery.

6. Bilateral cataract surgery.

7. Nephrostomy tube placement with bilateral stents, although the right stent has

been removed. 



SOCIAL HISTORY:  The patient lives with his wife in [place].  He admits to social

alcohol use only.  Denies smoking or illicit drug use. 



FAMILY HISTORY:  Noncontributory.



REVIEW OF SYSTEMS:  A 12-point review of system was reviewed and otherwise negative

other than what was commented on the HPI. 



PHYSICAL EXAMINATION:

VITAL SIGNS:  Temperature 98, pulse 93, respirations 18, blood pressure 130/72, and

saturation 95% on room air. 

GENERAL:  No apparent distress, communicative and alert, well-nourished,

well-developed, appears stated age. 

HEENT:  Normocephalic and atraumatic.  Pupils symmetric and round.  Trachea midline.

 Moist mucous membranes. 

CARDIOVASCULAR:  Regular rate and rhythm.  Normal S1 and S2.  Symmetric pulses. 

CHEST:  No increased work of breathing.  Symmetric expansion. 

LUNGS:  Clear anteriorly. 

ABDOMEN:  Soft, nontender, and nondistended.  Positive bowel sounds.  No

organomegaly or masses.  No hernias. 

BACK:  Right nephrostomy tube in place with clear yellow urine draining. 

:  No obvious deformities or swelling.  Testes are bilaterally descended.  Penis

nonfocal. 

EXTREMITIES:  No clubbing, cyanosis, or edema. 

MUSCULOSKELETAL:  No joint deformities or joint erythema noted.  Full range of

motion. 

SKIN:  Warm and dry.  Good turgor.  No rashes or lesions. 

LYMPH:  No obvious lymphadenopathy in the supraclavicular, axillary, or inguinal

areas. 

NEUROLOGIC:  Cranial nerves 2 through 12 grossly intact.  No focal or sensory motor

deficits identified. 

PSYCHIATRIC:  Alert and oriented x3.  Appropriate mood and affect.



LABORATORY EVALUATION:  The full set of labs are in the [facility] system, which I

have reviewed.  Of note, the patient's white count is 34.1 with a hemoglobin of

11.1, creatinine is 1.17, sodium of 126, alkaline phosphatase of 313.  Urine shows

turbid urine with 500 leuk esterase, 4 to 6 rbc's, greater than 50 wbc's, and 4+

bacteria. 



ASSESSMENT AND PLAN:  An 78-year-old white male with BPH and retention with

metastatic prostate cancer, currently on CIC with a nephrostomy tube with elevated

white count.  He is really not having any significant symptoms suggestive of urinary

tract infection.  Urine culture does show gram-negative rods, which is expected for

a patient on CIC and an indwelling nephrostomy tube.  It would be reasonable to

presumptively treat the patient as if he is having a urinary tract infection which

may have been the source of his elevated white count.  Otherwise, I would suspect

that the Neulasta is likely the more likely culprit.  In any case, an empiric trial

of antibiotics is reasonable.  If the patient does not seem sick and does not have

any other cultures that grow anything, he could potentially be taken off his

antibiotics, although treatment for 7 days at least would be reasonable given the

patient did have a fever and is currently receiving more chemotherapy.  I would

taper the antibiotics accordingly based on to the urine culture and use 7 days of an

oral antibiotic regimen for that.  I think CIC can probably be discontinued.  At

this point, we will increase his Flomax to b.i.d.  The patient is having some issues

with constipation, which may exacerbate his urinary symptoms, so we will start some

laxatives as well.  I will continue to follow the patient outpatient.  At some point

when he gets restaging CTs if his pelvic mass has shrunk enough, we can probably

remove the nephrostomy tube after antegrade nephrostogram and subsequently the left

stent as well.  For now, we will keep an eye on the patient and ensure that he is

voiding adequately. 







Job ID:  [id number]

## 2019-08-23 NOTE — HP
SUBJECTIVE:  The patient is seen and examined at the bedside.



CHIEF COMPLAINT:  Fever and generalized weakness.



HISTORY OF PRESENT ILLNESS:  The patient is an 78-year-old  male, who was

recently diagnosed with an aggressive form of prostate cancer and he received

chemotherapy approximately 8 days ago with Dr. Darby.  Yesterday, he started

having fever, temperature went up to 101.4.  He had some sweating and he was shaky.

His mental condition was slightly off.  He was told to call Dr. Darby when it

happens and she instructed him to go to the emergency room for further evaluation

and possible admission.  He had nephrostomy tube placed by Dr. Zuñiga approximately

a month ago, then he had right ureteral stent removed soon after that.  He is

switched to self-catheterization at this point 4 times a day.  He denied any cough

or shortness of breath.  He denied any abdominal pain.  His Woods catheter was

removed approximately 2 weeks ago.  He denied any nausea or vomiting. 



PAST MEDICAL HISTORY:  Positive for,

1. Hypertension.

2. Aggressive form of prostate cancer diagnosed just recently.

3. Hyperlipidemia.



PAST SURGICAL HISTORY:  

1. Appendectomy.

2. Inguinal and hiatal hernia.

3. Cholecystectomy.

4. Right shoulder surgery.

5. Bilateral cataract surgery.

6. Basal cell carcinoma removed.

7. Cardiac catheterization.



SOCIAL HISTORY:  He used to smoke, but quit more than 55 years ago.  Denies any

alcohol intake.  He denies any illicit drug use. 



FAMILY HISTORY:  His father passed away at the age of 73 of cerebrovascular accident

and mother had diabetes and CVA, and she passed when she was 66. 



MEDICATIONS:  

1. Levofloxacin 500 mg tablet, this was called in yesterday and the patient got one

dose last night. 

2. Drisdol that is vitamin D 1.25 mg once a day.

3. Amlodipine 5 mg once a day.

4. Flonase 50 mcg two sprays intranasally once a day.

5. Lovastatin 20 mg at bedtime.

6. Omeprazole 40 mg once a day.

7. Lyrica 50 mg twice a day.

8. Valsartan and hydrochlorothiazide together in the form of Diovan 320 mg/25 mg one

tablet once a day. 

9. Flomax 1 tablet once a day.

10. Casodex 50 mg one tablet, __________ chemo treatments.

11. Hydrocodone and acetaminophen 10 mg/325 mg one tablet every six hours p.r.n. as

needed. 

12. Lupron 1 mg subcutaneous injection once every four weeks.



ALLERGIES:  PENICILLIN AND SULFA.



REVIEW OF SYSTEMS:  All 14 systems were reviewed and they are negative, except for

findings and symptoms mentioned at HPI. 



PHYSICAL EXAMINATION:

VITAL SIGNS:  Blood pressure is 139/70, pulse is 88, temperature is 98.1,

respirations are 16, O2 saturation is 94% on room air. 

HEENT:  His head is atraumatic and normocephalic.  Eyes are PERRLA.  Sclerae are

nonicteric.  Oral mucosa is slightly dry. 

NECK:  Supple.  No lymphadenopathy.  Thyroid is not palpable. 

LUNGS:  Clear. 

HEART:  S1 and S2 normal.  No S3.  No S4. 

ABDOMEN:  Soft, nontender, nondistended.  Bowel sounds are present.  No

organomegaly. 

EXTREMITIES:  No clubbing or cyanosis.  There is 1+ peripheral edema around his left

ankle.  Pulses slightly diminished on both tibialis posterior and dorsalis pedis

arteries similar bilaterally. 

NEUROLOGICAL:  He is alert and oriented x4.  There is no any motor or sensory

deficits present.  Cranial nerves are intact. 



LABORATORY DATA:  Labs showed white count of 34.1, hemoglobin of 11.1, hematocrit

32.3, neutrophils 76, bands 4.  Sodium 126, potassium 4.2, chloride 95, CO2 of 21,

BUN 20, creatinine 1.17, glucose 133, alkaline phosphatase 313.  Urine showed

clarity turbid, protein 50, trace of blood, leukocyte esterase 500, RBCs 4 to 6 on

the scope along with WBCs, which is greater than 50 and 4+ bacteria, and the rest of

labs are within normal limits. 



IMAGING STUDIES:  Chest x-ray personally reviewed by me did not show any acute

abnormalities.  CT of the brain personally reviewed by me did not show any acute

abnormalities. 



IMPRESSION:  

1. Sepsis.  The patient was tachycardic at the time of emergency room visit with

elevated white count up to 34,000 and fever.  The cultures were drawn in the

emergency room on his blood and urine influenza type A and B, direct EIA were done

and both of them came back negative. 

2. 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

inappropriate antidiuretic hormone secretion .  We will limit his oral fluid intake

to a 1000 mL per 24 hours. 

3. 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

4. Hypertension.



PLAN:  Plan is full admission and condition is fair.  Activity, bedrest and bathroom

privileges.  IV normal saline at 80 mL/h.  Vancomycin and cefepime IV piggyback.

Pharmacy to dose vancomycin.  Blood cultures and urine cultures were done.  We will

get Dr. Darby, his primary oncologist involved, and Dr. Zuñiga, his primary

urologist.  We will continue his home medications.  We will do DVT prophylaxis with

SCDs and Lovenox.  Urine is probably the source.  He is self-catheterizing 4 times a

day, but his urine culture preliminary report is negative so far.  We will do follow

up on CBC and chemistry tomorrow. 







Job ID:  [id number]

## 2019-08-23 NOTE — CON
DATE OF CONSULTATION:  



REASON FOR CONSULT:  Prostate cancer.



HISTORY OF PRESENT ILLNESS:  Mr. Kilpatrick is a pleasant 78-year-old gentleman, who

has stage IV prostate cancer with metastasis to the bones.  He also has a large

pelvic mass with retroperitoneal lymphadenopathy causing bilateral hydronephrosis.

He did have a ureteral stent placed and a right percutaneous nephrostomy tube.  He

received cycle 1 of Taxotere on August 15.  He called our clinic yesterday with a

complaint of a temperature of greater than 101.  His wife states that he was having

some altered mental status.  He was given a dose of Levaquin and instructed to go to

the ER for worsening symptoms.  He did present last night to the emergency room.  He

had a brain CT and a chest x-ray which were normal.  His white count was 34.1,

likely secondary to Neulasta.  He had 4+ bacteria in his urine and was admitted for

urinary tract infection.  The patient was performing self catheterization at home

secondary to urinary retention from the pelvic mass.  He was started on cefepime and

vancomycin and has improved dramatically overnight. 



PAST MEDICAL HISTORY:  

1. Stage IV prostate cancer.

2. High blood pressure.

3. High cholesterol.

4. Acid reflex.

5. Hemorrhoids.



PAST SURGICAL HISTORY:  

1. Appendectomy.

2. Cataract surgery.

3. Laparoscopic Nissen fundoplication.

4. Cholecystectomy.

5. Basal cell carcinoma excision.

6. Right __________excision.

7. Cystoscopy with biopsy.



ALLERGIES:  NO KNOWN DRUG ALLERGIES.



HOME MEDICATIONS:  

1. Flomax 0.4 mg daily.

2. Prilosec daily.

3. Lyrica t.i.d.

4. Lovastatin daily.

5. Hydrocodone 10/325 p.r.n.

6. Diovan daily.

7. Casodex daily.

8. Amlodipine daily.



FAMILY HISTORY:  Had a half brother with lung cancer.  He has a daughter with two

different types of breast cancer. 



SOCIAL HISTORY:  He is , lives with his spouse.  No alcohol, tobacco, or

illicit drug use. 



REVIEW OF SYSTEMS:  A 10-point review of systems is negative except for noted in HPI.



PHYSICAL EXAMINATION:

VITAL SIGNS:  Temperature is 98.1, pulse is 89, respiratory rate 16, BP is 135/65.

He is 94% on room air. 

GENERAL:  This is a well-developed, well-nourished male, in no acute distress. 

HEENT:  Normocephalic, atraumatic.  Pupils are equal and reactive to light. 

NECK:  Supple. 

CV:  Regular rate and rhythm. 

LUNGS:  Clear. 

ABDOMEN:  Soft and nontender.  Bowel sounds are positive. 

EXTREMITIES:  No clubbing, cyanosis, or edema. 

SKIN:  No rash. 

HEMATOLOGIC:  No petechiae or purpura. 

NEUROLOGICAL:  Nonfocal.



PERTINENT LABS AND X-RAYS:  Current WBCs are 34.1, hemoglobin 11.1, hematocrit 32.3,

platelet count is 230,000, 76% neutrophils, 4% bands, 15% lymphocytes.  Sodium 126,

potassium 4.2, chloride 95, CO2 is 21, BUN is 20, creatinine is 1.17, lactic acid

0.8, calcium 8.5, bilirubin 1.0, AST is 25, ALT is 23, alkaline phosphatase is 313.

Troponin negative.  Serum total protein 6.5, albumin 3.6, globulin 2.9.  Urine

showed 4+ bacteria.  Micros positive for gram negative yesy.  Blood cultures are

negative, preliminary. 



ASSESSMENT:  

1. Stage IV prostate cancer, status post cycle 1 of chemotherapy.

2. Leukocytosis, likely secondary to Neulasta.

3. Urinary tract infection.



DISCUSSION:  The patient has been placed on vancomycin and cefepime.  Sensitivities

from cultures are currently pending.  He has been afebrile since arrival and is

feeling much better.  We would recommend discharge home, continue oral antibiotics

once sensitivities have resulted. 



Thank you for the consult.  We will follow up with him in the outpatient setting.







Job ID:  [id number]

## 2019-08-24 NOTE — CON
DATE OF CONSULTATION:  08/24/2019



REASON FOR CONSULTATION:  Complicated UTI/fever.



HISTORY OF PRESENT ILLNESS:  An 78-year-old, he recently diagnosed with 
metastatic

prostate cancer with lymph node and bone mets with a large mass causing 
bilateral

hydronephrosis.  The patient had bilateral renal stents placed.  The right 
stent was

not functional, so he required percutaneous nephrostomy and the right stent was

removed.  He had a Woods catheter and then in and out catheterization for a 
while,

now he is voiding without difficulty.  The patient has been started on 
chemotherapy

for the metastatic cancer in the pelvis and developed fever and was admitted.  
He

received meropenem that was not actually given because the patient had a 
history of

penicillin and he declined the medication, received one dose of vancomycin.  He 
has

felt improved since admission.  Has not had a fever again, maybe a low-grade

temperature of 99.7 recently.  Other vital signs are normal.  He currently is

feeling well.  Denies headaches, visual symptoms, sore throat, odynophagia, or

dysphagia.  No cough or sputum production.  No chest pain.  No abdominal pain.

Voiding without difficulty.  No back pain.  No joint symptoms.  No neurological

symptoms. 



PAST MEDICAL HISTORY:  Metastatic prostate cancer, lymph nodes in bone, invasive

mass in the pelvic area with bilateral hydronephrosis status post bilateral 
stents.

The right kidney stent was removed because it was nonfunctional and a 
percutaneous

nephrostomy placed, which the patient still carries.  Hyperlipidemia and

hypertension. 



PAST SURGICAL HISTORY:  Appendectomy, hernia repair, cholecystectomy, right 
shoulder

arthroscopy, cataracts, skin cancer removal, and cardiac cath. 



SOCIAL HISTORY:  Lives in [place].  Former smoker.



FAMILY HISTORY:  CVA and type 2 diabetes.



CURRENT MEDICATIONS:  

1. Norco.

2. Norvasc.

3. Casodex.

4. Coenzyme Q.

5. Lovenox.

6. Drisdol.

7. Flonase.

8. Hydrochlorothiazide.

9. Lactulose.

10. Macrobid.

11. Lyrica.

12. Zocor.



PHYSICAL EXAMINATION:

VITAL SIGNS:  98.8.  Other vital signs are essentially normal.  Mild elevation 
of

systolic blood pressure. 

SKIN:  No skin areas of breakdown.  Peripheral IV access.  No Woods catheter.  
No

lymphadenopathy. 

HEENT:  Ocular movements conjugate.  Oral cavity normal.  Numerous teeth in 
place

with no inflammatory changes. 

NECK:  Supple.  No jugular vein distention.  No thyromegaly. 

LUNGS:  Symmetric clear breath sounds. 

HEART:  S1 and S2.  Regular rate.  No S3 or S4. 

ABDOMEN:  Soft, not distended or tender.  No ascites.  No bladder distention.  
No

joint inflammatory activity.  Percutaneous nephrostomy with no abnormalities 
noted. 

EXTREMITIES:  Moves all extremities equally. 

NEUROLOGIC:  Cognitive function appears to be intact.



LABORATORY DATA:  White cell count of 26.3, hemoglobin 9.4, MCV 86, platelets 
177,

and 80% neutrophils.  Sodium 133, creatinine 0.85, alkaline phosphatase 313, and

albumin 3.6.  Urinalysis with greater than 50 wbc's and Klebsiella oxytoca with 
a

broad resistance profile except for meropenem, nitrofurantoin, and amikacin. 



ASSESSMENT AND PLAN:  Metastatic prostate cancer, both local and distant 
metastases,

bilateral hydronephrosis due to pelvic mass on chemotherapy.  The patient was 
given

Neupogen or Neulasta and has had marked increase in neutrophil count since the

chemotherapy.  He was never significantly neutropenic though and now has 
developed

transient fever with a positive urine culture and a pyuria.  The differential

diagnosis includes invasive UTI, as the more likely scenario here, although the

findings in the urinalysis are not necessarily related to the febrile episode.  
The

options for management would include placement of a peripherally inserted 
central

catheter line and treatment with meropenem or colistin, but the patient at this 
time

is not inclined to do that.  He declined that option.  We could also try 
fosfomycin

every other day for five doses and I discussed with them that there is a high a

chance of failure of that option.  The patient has had adequate

resolution of the obstruction and is able to void now without difficulty and 
has a

functioning nephrostomy tube and there is a significant chance that he might be

clinically asymptomatic on fosfomycin for a brief period of time.  In the other

hand, he might experience recrudescence of the fever.  In that case, he would 
have

to be readmitted and have a peripherally inserted central catheter line 
placement

and start meropenem after repeat cultures.  Thromboembolism is not likely.  
There is

no other intraabdominal inflammatory process or respiratory tract involvement

noticed at this point in time. 







Job ID:  [id number]



[facility]

## 2019-08-24 NOTE — EKG
Test Reason : 

Blood Pressure : ***/*** mmHG

Vent. Rate : 099 BPM     Atrial Rate : 099 BPM

   P-R Int : 200 ms          QRS Dur : 082 ms

    QT Int : 328 ms       P-R-T Axes : 027 -03 017 degrees

   QTc Int : 420 ms

 

Sinus rhythm with Premature atrial complexes

Cannot rule out Anterior infarct , age undetermined

Abnormal ECG

 

Confirmed by ARMANDO BURK, JANNETTE ([id number]),  MARIANA GONZALEZ ([id number]) on 8/24/2019 12:07:57 PM

 

Referred By:             Confirmed By:JANNETTE ROBERTS MD

## 2019-08-26 NOTE — DIS
DATE OF ADMISSION:  08/23/2019



DATE OF DISCHARGE:  08/24/2019



DIAGNOSES AT THE TIME OF DISCHARGE:  

1. Urinary tract infection, presenting with fever.

2. 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

inappropriate antidiuretic hormone secretion. 

3. 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

4. Hypertension.

5. Elevated white cell count, felt to be mostly related to use of Neulasta with his

last chemotherapy. 

6. Hyperlipidemia.



CONSULTANTS:  

1. Dr. Zuñiga, Urology Service and Dr. Figueroa, Urology Service.

2. Dr. Monzon, Infectious Disease Service.

3. Dr. Darby, Oncology Service.



HOSPITAL COURSE:  The patient is an 78-year-old  male, who was recently

diagnosed with aggressive form of prostate cancer with bone metastasis, which means

stage IV, who received chemotherapy approximately 8 days prior to this

hospitalization by Dr. Darby and got admitted to the hospital with 1-day history

of fever up to 101.4, shakiness, sweating.  After the patient called Dr. Darby, he

was advised to go to the emergency room for further evaluation.  The patient had

nephrostomy tube placed by Dr. Zuñiga approximately a month ago, then he had right

ureteral stent removed soon after that.  He was self-catheterizing four times a day

and he started having some ability to empty bladder much better recently.  He denied

any cough or shortness of breath.  No abdominal pain.  His Woods catheter was

removed 2 weeks ago.  He denied any nausea or vomiting.  At the time of emergency

room visit, his white count was 34,000, hemoglobin 11.1, hematocrit 32, neutrophils

76, bands 4.  Sodium was low at 126, potassium 4.2, chloride 95, CO2 of 21, BUN 20,

creatinine 1.17, alkaline phosphatase was elevated at 313.  Urine showed turbid

clarity, protein 50, trace blood, leukocyte esterase is 500, 4 to 6 rbc's on the

scope along with wbc's, which were greater than 50 and 4+ bacteria, and the rest of

the labs were within normal limits.  Chest x-ray did not show any abnormalities and

the CT of the brain was done because he had very transient episode of acute mental

dysfunction, but this CT did not show any acute abnormalities.  It was felt that

maybe he was septic because his white count was up to 34,000 with fever.  He had

influenza type A and B done in the emergency room and they were negative.  His

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

to his prostate and metastasis.  The patient was started on IV fluids and vancomycin

and cefepime.  Blood cultures were done.  The next day, his white cell count was

dropped to 24,000.  Clinically, he looked good.  In the beginning, we thought that

maybe he is septic, but this was most likely related to his last Neulasta use during

his chemotherapy approximately 8 days ago prior to this hospitalization.  Dr. Zuñiga saw the patient and this was followed by Dr. Figueroa, who covered for him over

the weekend.  Dr. Figueroa recommended to use Macrobid for outpatient treatment, but

after consultation with Dr. Monzon for Infectious Diseases, the patient was given

prescription for fosfomycin and no Macrobid to take at home.  His two blood cultures

came back negative in 48 hours, but urine culture came back positive for Klebsiella

oxytoca, which was multiresistant organism, sensitive to only meropenem and

nitrofurantoin and amikacin.  The patient refused to take meropenem and his

temperature went down and today was up to 99.7 and maximal temperature yesterday was

100.4.  Now, the temperature is 98.8, pulse is 84, respirations 16, O2 saturation

95% on room air, blood pressure is 149/67.  He is discharged home in good condition

with recommendation to stay on a low-salt diet.  Activities as tolerated.  He will

continue on fosfomycin as per Dr. Monzon' recommendation.  The patient was given

prescription for that from Dr. Monzon.  Also, he will continue his previously

scheduled all other medications, which are Casodex 50 mg once a day,

valsartan/hydrochlorothiazide one tablet once a day, Flomax 0.4 mg q.h.s., Senokot

two tablets twice a day, Lyrica 50 mg p.o., lovastatin 20 mg q.h.s., hydrocodone

10/325 mg one tablet every 4 hours p.r.n. as needed, fluticasone two sprays p.r.n.

as needed, Nexium 40 mg p.o. daily, and amlodipine 10 mg daily.  He is leaving the

hospital in good condition.  He will follow up with Dr. Zuñiga and Dr. Darby.

The patient was given phone numbers to call those offices and set up followup

appointment, and the time spent on this discharge is less than 30 minutes. 







Job ID:  [id number]

## 2019-10-04 NOTE — SPC
EXAM:

SPC INJ PROC NEPH /URET EXIST



PROVIDED CLINICAL HISTORY:

Patient with bilateral ureteral obstruction secondary to prostate cancer. Evaluation of the right rich
al collecting system and ureter was requested for possible removal of the nephrostomy tube.



COMPARISON:

None



FINDINGS:

A right nephrostogram was performed. 10 mL of Isovue-300 contrast was injected into the right renal c
ollecting system with contrast coursing down the ureter to the level of the mid ureter. Sequential

imaging was obtained at 5, 10, and 15 minutes with delayed imaging obtained up to 40 minutes. Small a
mount of contrast does extend into the urinary bladder, but the majority of the contrast remains

within the right renal collecting system as well as in the ureter to the level of the inferior aspect
 of the right sacroiliac joint.



IMPRESSION:

Right nephrostogram demonstrating persistence of contrast within the right renal collecting system an
d ureter imaging out to approximately 40 minutes with suggestion of only minimal contrast extending

into the urinary bladder.



Reported By: Amos Zurita 

Electronically Signed:  10/4/2019 1:03 PM

## 2019-10-04 NOTE — SPC
EXAM:

SPC PERC TUBE CHANGE S I



PROVIDED CLINICAL HISTORY:

Bilateral ureteral obstruction secondary to prostate cancer. Patient has right nephrostomy tube in pl
ace. Removal or replacement of the nephrostomy tube was requested. A tube nephrostogram was

performed prior to this examination, but the right renal collecting system and ureter do not complete
ly empty. As result nephrostomy tube exchange was performed.



COMPARISON:

Right nephrostogram images obtained prior to this exam.



Fluoroscopy: Total time is 1.8 minutes with total dose of 4799 mGy centimeter squared.



TECHNIQUE:

After informed consent was obtained, the patient was placed on the angiography table in the prone pos
ition. The indwelling nephrostomy tube and surrounding area were meticulously prepped and draped in

usual sterile fashion. Attempts at exchanging the nephrostomy tube over a 0.035 inch Scopial Fashion guidewir
e were unsuccessful as the guidewire was unable to be advanced distal to the tip of the nephrostomy

tube. As result, the catheter was exchanged over a 0.035 inch Glidewire for a new 8 Qatari percutaneo
us nephrostomy tube. The distal portion of the nephrostomy tube was positioned in the right renal

pelvis. Contrast injection confirms placement in the renal collecting system. The tube was flushed an
d placed to gravity drainage. Dry sterile dressing was placed. Patient tolerated the procedure well

and without immediate complication.



IMPRESSION:

Technically successful right nephrostomy tube replacement.



Reported By: Amos Zurita 

Electronically Signed:  10/4/2019 2:53 PM

## 2019-12-26 ENCOUNTER — HOSPITAL ENCOUNTER (OUTPATIENT)
Dept: HOSPITAL 92 - BICULT | Age: 79
Discharge: HOME | End: 2019-12-26
Attending: UROLOGY
Payer: MEDICARE

## 2019-12-26 DIAGNOSIS — N13.30: Primary | ICD-10-CM

## 2019-12-26 DIAGNOSIS — R39.14: ICD-10-CM

## 2019-12-26 PROCEDURE — 76770 US EXAM ABDO BACK WALL COMP: CPT

## 2019-12-26 PROCEDURE — 84153 ASSAY OF PSA TOTAL: CPT

## 2019-12-26 NOTE — ULT
ULTRASOUND RETROPERITONEUM COMPLETE:

(RENAL)



DATE:

12/26/2019



HISTORY:

79-year-old male with "bilateral hydronephrosis."

Prostate cancer.



FINDINGS:

The right kidney measures 11 x 5.5 x 5 cm.

The left kidney measures 13 x 6 x 6 cm.

Both kidneys have normal parenchymal echogenicity.

There is no hydronephrosis.

No moderate sized or large renal cystic or solid renal lesion is identified.

Cursory images of the urinary bladder demonstrate diffuse minimal mural thickening and mural irregula
rity.

Prevoid bladder volume: 455 mL.

Post void bladder volume: 95 mL.

The prostate gland was not imaged in detail on the current ultrasound.



IMPRESSION:



1. No hydronephrosis.

2. Incomplete micturition.



Reported By: Rodrigo Horowitz 

Electronically Signed:  12/26/2019 1:14 PM

## 2020-01-03 ENCOUNTER — HOSPITAL ENCOUNTER (OUTPATIENT)
Dept: HOSPITAL 92 - SCSMRI | Age: 80
Discharge: HOME | End: 2020-01-03
Attending: INTERNAL MEDICINE
Payer: MEDICARE

## 2020-01-03 DIAGNOSIS — C61: Primary | ICD-10-CM

## 2020-01-03 DIAGNOSIS — C79.51: ICD-10-CM

## 2020-01-03 DIAGNOSIS — R59.0: ICD-10-CM

## 2020-01-03 DIAGNOSIS — M89.9: ICD-10-CM

## 2020-01-03 PROCEDURE — 72197 MRI PELVIS W/O & W/DYE: CPT

## 2020-01-03 PROCEDURE — 72158 MRI LUMBAR SPINE W/O & W/DYE: CPT

## 2020-01-03 NOTE — MRI
MRI OF LUMBAR SPINE PERFORMED WITH AND WITHOUT CONTRAST ENHANCEMENT:

1/3/20

 

HISTORY: 

Prostate cancer with bone mets. 

 

COMPARISON: 

CT examination of 10/14/19 and bone scan study of 8/6/19. 

 

Vertebral bodies all maintain normal height. Severe disc narrowing is again noted at L4-5. Metastatic
 bone lesions appear stable as compared to the previous CT study. I do not see any new lesions. No si
gns of any compression fractures. 

 

There does appear to be a mild interval improvement to some of the periaortic lymph nodes. The best e
xample of this is area of confluent adenopathy near the left renal artery measuring 3.3 as compared t
o 4 cm on the previous CT study and slightly inferior to this is an area of confluent adenopathy reina
uring 3.6 x 4.5 cm now measuring 3.3 x 3.4 cm. A left common iliac node is 2.1 cm as compared to 2.3 
on the previous exam. 

 

T12-L1: Unremarkable.

 

L1-2: Unremarkable. 

 

L2-3: Degenerative facet changes without significant canal or foraminal stenosis. 

 

L3-4: Degenerative facet changes without significant canal or foraminal narrowing. 

 

L4-5: Canal shows some borderline narrowing. There is some disc bulging. There are facet and ligament
ous hypertrophic changes. There is some moderate bilateral foraminal narrowing at this level. 

 

L5-S1: Unremarkable.

 

IMPRESSION: 

1.      Stable appearance to the size and number of the bone lesions.

2.      Interval slight improvement to the periaortic adenopathy. 

 

POS: [facility]

## 2020-01-03 NOTE — MRI
MRI OF PELVIS PERFORMED WITH AND WITHOUT CONTRAST ENHANCEMENT:

1/3/20

 

COMPARISON: 

CT performed 10/4/19 and CT performed 7/23/19.

 

HISTORY: 

Prostate cancer with bone mets. 

 

The pelvic lymphadenopathy which is somewhat ill-defined and confluent adenopathy in the common iliac
 chains and extending into the internal iliac and external iliac chains is again demonstrated. There 
is a slight overall interval improvement to the degree of lymphadenopathy. It is difficult to accurat
ely measure but one of the better areas for comparison is at the proximal external iliac chain on the
 right where the node measured 18 mm in short axis dimension now measuring 14 mm. Similar location on
 the left side showed the nodes measuring approximately 14 mm in short axis dimension as compared to 
10 on today's study. There has also been a slight interval decrease in size of the soft tissue mass w
hich is just along the left side of the prostate. The best comparison is the AP measurement of 5 cm o
n the prior study as compared to 4 cm on the current exam. 

 

The bony metastatic lesions appear stable. I do not see any signs of any pathologic fracture. The siz
e and appearance of the right femoral neck lesion is similar. 

 

IMPRESSION: 

1.      Fairly stable overall appearance to the bony metastatic disease. 

2.      Slight interval improvement in some of the pelvic lymphadenopathy. 

 

POS: [facility]

## 2020-01-30 ENCOUNTER — HOSPITAL ENCOUNTER (OUTPATIENT)
Dept: HOSPITAL 92 - SPEC | Age: 80
Discharge: HOME | End: 2020-01-30
Attending: UROLOGY
Payer: MEDICARE

## 2020-01-30 VITALS — BODY MASS INDEX: 24.8 KG/M2

## 2020-01-30 DIAGNOSIS — Z79.899: ICD-10-CM

## 2020-01-30 DIAGNOSIS — J44.9: ICD-10-CM

## 2020-01-30 DIAGNOSIS — Z79.82: ICD-10-CM

## 2020-01-30 DIAGNOSIS — I10: ICD-10-CM

## 2020-01-30 DIAGNOSIS — N32.0: ICD-10-CM

## 2020-01-30 DIAGNOSIS — C61: Primary | ICD-10-CM

## 2020-01-30 DIAGNOSIS — E78.5: ICD-10-CM

## 2020-01-30 DIAGNOSIS — Z87.891: ICD-10-CM

## 2020-01-30 DIAGNOSIS — Z88.8: ICD-10-CM

## 2020-01-30 DIAGNOSIS — C77.2: ICD-10-CM

## 2020-01-30 DIAGNOSIS — Z88.1: ICD-10-CM

## 2020-01-30 DIAGNOSIS — Z88.0: ICD-10-CM

## 2020-01-30 DIAGNOSIS — N40.0: ICD-10-CM

## 2020-01-30 DIAGNOSIS — Z88.2: ICD-10-CM

## 2020-01-30 DIAGNOSIS — K21.9: ICD-10-CM

## 2020-01-30 DIAGNOSIS — N13.5: ICD-10-CM

## 2020-01-30 PROCEDURE — C1729 CATH, DRAINAGE: HCPCS

## 2020-01-30 PROCEDURE — 50436 DILAT XST TRC NDURLGC PX: CPT

## 2020-01-30 PROCEDURE — 0T25X0Z CHANGE DRAINAGE DEVICE IN KIDNEY, EXTERNAL APPROACH: ICD-10-PCS | Performed by: RADIOLOGY

## 2020-01-30 PROCEDURE — 50431 NJX PX NFROSGRM &/URTRGRM: CPT

## 2020-01-30 PROCEDURE — 75984 XRAY CONTROL CATHETER CHANGE: CPT

## 2020-01-30 NOTE — SPC
SPC EXCHANGE NEPHROSTOMY CATHETER:



PROVIDED CLINICAL HISTORY:

Bilateral ureteral obstruction secondary to prostate cancer. Patient has right-sided nephrostomy tube
 in place. Replacement of nephrostomy tube was requested.



COMPARISON:

10/4/2019.



FLUOROSCOPY:

Time-2.6 minutes.

Dose 19,777 mGy*^cm2.



TECHNIQUE:

After informed consent was obtained, the patient was placed on the angiography table in the prone pos
ition.  The indwelling right nephrostomy tube and surrounding area were to meticulously prepped and

draped in usual sterile fashion. The nephrostomy tube was punctured with an 18-gauge needle, and cont
rast was injected. The distal portion of the nephrostomy tube was noted to be within a peripheral

calyx. A 0.035 inch Bentsen guidewire was placed, but after multiple attempts, the guidewire was unab
le to be manipulated distal to the tip of the nephrostomy tube. The nephrostomy tube and guidewire

were both removed. A 5 Bulgarian Berenstein catheter and 0.035 inch Bentsen guidewire were then manipula
alejandra into the right renal collecting system and subsequently down the right ureter. Contrast

injection confirms placement.



The catheter was exchanged over the guidewire for an 8 Bulgarian nephrostomy tube. The distal portion of
 the nephrostomy tube was placed in the renal pelvis. Contrast injection confirms placement.



The catheter was placed to gravity drainage and sutured in place utilizing 2-0 Ethilon suture materia
l. A dry sterile dressing was placed. The patient tolerated the procedure well and without

immediate complication. The patient was transported to radiology nurses holding area for further yancy
toring prior to discharge.



IMPRESSION:

Technically successful right nephrostomy tube replacement.



Transcribed Date/Time: 1/30/2020 11:08 AM



Reported By: Amos Zurita 

Electronically Signed:  1/30/2020 4:47 PM

## 2020-01-30 NOTE — SPC
EXAM:

SPC INJ PROC NEPH /URET EXIST



PROVIDED CLINICAL HISTORY:

Patient with bilateral ureteral obstruction secondary to prostate cancer. Evaluation right renal fidel
ecting system and ureter was requested for possible removal of nephrostomy tube versus replacement.



COMPARISON:

10/4/2019



FINDINGS:

A right nephrostogram was performed. Approximately 15 mL of Isovue-300 contrast was injected into the
 right renal collecting system with contrast coursing down a normal caliber ureter and subsequently

into the urinary bladder. The patient did not experience discomfort with contrast injection. Repeat i
maging at 15 minutes demonstrates persistence of contrast within the left ureter and in the

collecting system, but contrast does appear to extend into the urinary bladder. Left ureteral stent s
een on prior exam is been removed.



Imaging findings were discussed with Dr. Zuñiga at this time. Dr. Zuñiga requested nephrostomy tube
 exchange.



IMPRESSION:

Right nephrostogram demonstrating extension of contrast into the urinary bladder, and there is no tad
dence of hydronephrosis or hydroureter. There is persistence of contrast in the renal collecting

system on the 15 minute delayed image, but there is slow progression of contrast into the urinary zuhair
dder.



Reported By: Amos Zurita 

Electronically Signed:  1/30/2020 9:49 AM

## 2020-02-10 ENCOUNTER — HOSPITAL ENCOUNTER (OUTPATIENT)
Dept: HOSPITAL 92 - BICRAD | Age: 80
Discharge: HOME | End: 2020-02-10
Attending: FAMILY MEDICINE
Payer: MEDICARE

## 2020-02-10 DIAGNOSIS — R05: Primary | ICD-10-CM

## 2020-02-10 PROCEDURE — 71046 X-RAY EXAM CHEST 2 VIEWS: CPT

## 2020-02-10 NOTE — RAD
EXAM:

Chest 2 views:



HISTORY:

Cough



COMPARISON:

11/15/2019



FINDINGS:

There is a normal-sized cardiomediastinal silhouette. There is no evidence of consolidation, mass, or
 pleural effusion.   The bones are unremarkable. 





IMPRESSION:

No evidence of acute cardiopulmonary disease



Reported By: Jhonny Leggett 

Electronically Signed:  2/10/2020 4:09 PM

## 2020-04-28 ENCOUNTER — HOSPITAL ENCOUNTER (OUTPATIENT)
Dept: HOSPITAL 92 - SPEC | Age: 80
Discharge: HOME | End: 2020-04-28
Attending: UROLOGY
Payer: MEDICARE

## 2020-04-28 VITALS — TEMPERATURE: 98 F | DIASTOLIC BLOOD PRESSURE: 72 MMHG | SYSTOLIC BLOOD PRESSURE: 151 MMHG

## 2020-04-28 VITALS — BODY MASS INDEX: 24.9 KG/M2

## 2020-04-28 DIAGNOSIS — N13.5: Primary | ICD-10-CM

## 2020-04-28 DIAGNOSIS — Z88.2: ICD-10-CM

## 2020-04-28 DIAGNOSIS — C61: ICD-10-CM

## 2020-04-28 DIAGNOSIS — Z88.8: ICD-10-CM

## 2020-04-28 DIAGNOSIS — Z79.82: ICD-10-CM

## 2020-04-28 DIAGNOSIS — N40.0: ICD-10-CM

## 2020-04-28 DIAGNOSIS — Z87.891: ICD-10-CM

## 2020-04-28 DIAGNOSIS — Z79.899: ICD-10-CM

## 2020-04-28 DIAGNOSIS — K21.9: ICD-10-CM

## 2020-04-28 DIAGNOSIS — Z88.0: ICD-10-CM

## 2020-04-28 DIAGNOSIS — Z88.1: ICD-10-CM

## 2020-04-28 DIAGNOSIS — J44.9: ICD-10-CM

## 2020-04-28 DIAGNOSIS — I10: ICD-10-CM

## 2020-04-28 PROCEDURE — 50431 NJX PX NFROSGRM &/URTRGRM: CPT

## 2020-04-28 PROCEDURE — 0TP5X0Z REMOVAL OF DRAINAGE DEVICE FROM KIDNEY, EXTERNAL APPROACH: ICD-10-PCS | Performed by: RADIOLOGY

## 2020-04-28 PROCEDURE — BT111ZZ FLUOROSCOPY OF RIGHT KIDNEY USING LOW OSMOLAR CONTRAST: ICD-10-PCS | Performed by: RADIOLOGY

## 2020-04-28 NOTE — SPC
RIGHT NEPHROSTOGRAM WITH FLUOROSCOPY

FLUOROSCOPIC GUIDED RIGHT PCN REMOVAL:



HISTORY: 

Prostate cancer. Ureteral obstruction. Evaluate for ureteral patency.



Fluoroscopy time 1.0 minute.



FINDINGS: 

After explaining the procedure and answering all questions, approximately 10 cc of iodine contrast wa
s carefully instilled through the indwelling right PCN. Coil was positioned in a lateral interpolar

calyx. Minimal distention of the calyces.



There was immediate extension of contrast through the ureter and into the urinary bladder with ureter
al jet visualized.



After 20 minutes, reevaluation with fluoroscopy shows minimal remaining contrast within a right renal
 calyx. Remainder of the contrast had passed into the urinary bladder.



The external portion of the right PCN was prepped and draped in the  usual sterile fashion. Catheter 
was ligated. A 0.035 Amplatz wire was placed to hold position and removed the PCN catheter. Wire

was also removed. Dressing placed over the drainage site.



Patient tolerated the procedure well and was dismissed in good condition.



IMPRESSION :

Right ureter is patent. No evidence of obstruction. Successful removal of the right PCN.



Transcribed Date/Time: 4/28/2020 12:41 PM



Reported By: NILDA Mistry 

Electronically Signed:  4/28/2020 1:10 PM

## 2020-09-08 ENCOUNTER — HOSPITAL ENCOUNTER (OUTPATIENT)
Dept: HOSPITAL 92 - BICRAD | Age: 80
Discharge: HOME | End: 2020-09-08
Attending: FAMILY MEDICINE
Payer: MEDICARE

## 2020-09-08 DIAGNOSIS — Z85.46: ICD-10-CM

## 2020-09-08 DIAGNOSIS — C79.51: ICD-10-CM

## 2020-09-08 DIAGNOSIS — I51.7: ICD-10-CM

## 2020-09-08 DIAGNOSIS — R05: Primary | ICD-10-CM

## 2020-09-08 PROCEDURE — 71046 X-RAY EXAM CHEST 2 VIEWS: CPT

## 2020-09-08 NOTE — RAD
PA AND LATERAL CHEST:

9/8/20

 

HISTORY: 

Cough. 

 

COMPARISON: 

2/10/20 study.

 

Heart size is enlarged. There are atherosclerotic changes of the aorta. There is some linear change i
n the lung bases more suggestive of a scar although perhaps atelectasis. No focal infiltrative proces
s noted. 

 

IMPRESSION: 

1.      Cardiomegaly with more chronic appearing lung change. 

2.      Sclerotic appearing densities in some of the thoracic vertebral bodies have been present on t
he previous exam of 2/10/20. Also an 11/15/19 exam. The patient does have a history of prostate cance
r with bone mets. 

 

POS: [facility]

## 2022-10-10 ENCOUNTER — HOSPITAL ENCOUNTER (OUTPATIENT)
Dept: HOSPITAL 92 - SCSMRI | Age: 82
Discharge: HOME | End: 2022-10-10
Attending: ANESTHESIOLOGY
Payer: MEDICARE

## 2022-10-10 DIAGNOSIS — M25.551: ICD-10-CM

## 2022-10-10 DIAGNOSIS — C79.51: ICD-10-CM

## 2022-10-10 DIAGNOSIS — M54.16: Primary | ICD-10-CM

## 2022-10-10 DIAGNOSIS — C78.6: ICD-10-CM

## 2022-10-10 PROCEDURE — 72148 MRI LUMBAR SPINE W/O DYE: CPT

## 2022-10-24 ENCOUNTER — HOSPITAL ENCOUNTER (OUTPATIENT)
Dept: HOSPITAL 92 - NM | Age: 82
Discharge: HOME | End: 2022-10-24
Attending: INTERNAL MEDICINE
Payer: MEDICARE

## 2022-10-24 DIAGNOSIS — C79.51: ICD-10-CM

## 2022-10-24 DIAGNOSIS — C61: Primary | ICD-10-CM

## 2022-10-24 PROCEDURE — 78306 BONE IMAGING WHOLE BODY: CPT

## 2022-10-24 PROCEDURE — A9503 TC99M MEDRONATE: HCPCS

## 2022-11-10 ENCOUNTER — HOSPITAL ENCOUNTER (OUTPATIENT)
Dept: HOSPITAL 92 - CSHCT | Age: 82
Discharge: HOME | End: 2022-11-10
Attending: INTERNAL MEDICINE
Payer: MEDICARE

## 2022-11-10 DIAGNOSIS — C79.51: ICD-10-CM

## 2022-11-10 DIAGNOSIS — N28.9: ICD-10-CM

## 2022-11-10 DIAGNOSIS — R93.7: ICD-10-CM

## 2022-11-10 DIAGNOSIS — R59.1: ICD-10-CM

## 2022-11-10 DIAGNOSIS — K44.9: ICD-10-CM

## 2022-11-10 DIAGNOSIS — C61: Primary | ICD-10-CM

## 2022-11-10 LAB — EGFRCR SERPLBLD CKD-EPI 2021: 84 ML/MIN/{1.73_M2}

## 2022-11-10 PROCEDURE — 74177 CT ABD & PELVIS W/CONTRAST: CPT

## 2022-11-10 PROCEDURE — 82565 ASSAY OF CREATININE: CPT

## 2022-11-10 PROCEDURE — 71260 CT THORAX DX C+: CPT

## 2022-12-05 ENCOUNTER — HOSPITAL ENCOUNTER (INPATIENT)
Dept: HOSPITAL 92 - CSHERS | Age: 82
LOS: 8 days | Discharge: NURSING, REHAB OR LONG-TERM CARE | DRG: 871 | End: 2022-12-13
Attending: FAMILY MEDICINE | Admitting: FAMILY MEDICINE
Payer: MEDICARE

## 2022-12-05 VITALS — BODY MASS INDEX: 27.8 KG/M2

## 2022-12-05 DIAGNOSIS — K21.9: ICD-10-CM

## 2022-12-05 DIAGNOSIS — Z79.899: ICD-10-CM

## 2022-12-05 DIAGNOSIS — Z83.3: ICD-10-CM

## 2022-12-05 DIAGNOSIS — E87.6: ICD-10-CM

## 2022-12-05 DIAGNOSIS — Z90.49: ICD-10-CM

## 2022-12-05 DIAGNOSIS — I21.A1: ICD-10-CM

## 2022-12-05 DIAGNOSIS — I10: ICD-10-CM

## 2022-12-05 DIAGNOSIS — I95.9: ICD-10-CM

## 2022-12-05 DIAGNOSIS — Z88.8: ICD-10-CM

## 2022-12-05 DIAGNOSIS — C79.51: ICD-10-CM

## 2022-12-05 DIAGNOSIS — Z88.0: ICD-10-CM

## 2022-12-05 DIAGNOSIS — Z20.822: ICD-10-CM

## 2022-12-05 DIAGNOSIS — Z79.82: ICD-10-CM

## 2022-12-05 DIAGNOSIS — I48.92: ICD-10-CM

## 2022-12-05 DIAGNOSIS — Z82.3: ICD-10-CM

## 2022-12-05 DIAGNOSIS — G93.41: ICD-10-CM

## 2022-12-05 DIAGNOSIS — J96.01: ICD-10-CM

## 2022-12-05 DIAGNOSIS — Z87.891: ICD-10-CM

## 2022-12-05 DIAGNOSIS — Z98.42: ICD-10-CM

## 2022-12-05 DIAGNOSIS — M46.26: ICD-10-CM

## 2022-12-05 DIAGNOSIS — A40.1: Primary | ICD-10-CM

## 2022-12-05 DIAGNOSIS — I48.91: ICD-10-CM

## 2022-12-05 DIAGNOSIS — C61: ICD-10-CM

## 2022-12-05 DIAGNOSIS — I44.0: ICD-10-CM

## 2022-12-05 DIAGNOSIS — Z98.41: ICD-10-CM

## 2022-12-05 DIAGNOSIS — M46.46: ICD-10-CM

## 2022-12-05 DIAGNOSIS — G89.29: ICD-10-CM

## 2022-12-05 PROCEDURE — 80053 COMPREHEN METABOLIC PANEL: CPT

## 2022-12-05 PROCEDURE — 86140 C-REACTIVE PROTEIN: CPT

## 2022-12-05 PROCEDURE — 93005 ELECTROCARDIOGRAM TRACING: CPT

## 2022-12-05 PROCEDURE — 83735 ASSAY OF MAGNESIUM: CPT

## 2022-12-05 PROCEDURE — 80061 LIPID PANEL: CPT

## 2022-12-05 PROCEDURE — 85652 RBC SED RATE AUTOMATED: CPT

## 2022-12-05 PROCEDURE — 80202 ASSAY OF VANCOMYCIN: CPT

## 2022-12-05 PROCEDURE — 87811 SARS-COV-2 COVID19 W/OPTIC: CPT

## 2022-12-05 PROCEDURE — A9579 GAD-BASE MR CONTRAST NOS,1ML: HCPCS

## 2022-12-05 PROCEDURE — 85730 THROMBOPLASTIN TIME PARTIAL: CPT

## 2022-12-05 PROCEDURE — 36569 INSJ PICC 5 YR+ W/O IMAGING: CPT

## 2022-12-05 PROCEDURE — 94640 AIRWAY INHALATION TREATMENT: CPT

## 2022-12-05 PROCEDURE — 81003 URINALYSIS AUTO W/O SCOPE: CPT

## 2022-12-05 PROCEDURE — 36416 COLLJ CAPILLARY BLOOD SPEC: CPT

## 2022-12-05 PROCEDURE — 85025 COMPLETE CBC W/AUTO DIFF WBC: CPT

## 2022-12-05 PROCEDURE — 82553 CREATINE MB FRACTION: CPT

## 2022-12-05 PROCEDURE — 84145 PROCALCITONIN (PCT): CPT

## 2022-12-05 PROCEDURE — 94760 N-INVAS EAR/PLS OXIMETRY 1: CPT

## 2022-12-05 PROCEDURE — 72157 MRI CHEST SPINE W/O & W/DYE: CPT

## 2022-12-05 PROCEDURE — 71275 CT ANGIOGRAPHY CHEST: CPT

## 2022-12-05 PROCEDURE — 76705 ECHO EXAM OF ABDOMEN: CPT

## 2022-12-05 PROCEDURE — 83605 ASSAY OF LACTIC ACID: CPT

## 2022-12-05 PROCEDURE — 87040 BLOOD CULTURE FOR BACTERIA: CPT

## 2022-12-05 PROCEDURE — 85610 PROTHROMBIN TIME: CPT

## 2022-12-05 PROCEDURE — L0639 LSO S/C SHELL/PANEL PREFAB: HCPCS

## 2022-12-05 PROCEDURE — 70450 CT HEAD/BRAIN W/O DYE: CPT

## 2022-12-05 PROCEDURE — 85018 HEMOGLOBIN: CPT

## 2022-12-05 PROCEDURE — 93306 TTE W/DOPPLER COMPLETE: CPT

## 2022-12-05 PROCEDURE — 87149 DNA/RNA DIRECT PROBE: CPT

## 2022-12-05 PROCEDURE — 87077 CULTURE AEROBIC IDENTIFY: CPT

## 2022-12-05 PROCEDURE — 85049 AUTOMATED PLATELET COUNT: CPT

## 2022-12-05 PROCEDURE — C1751 CATH, INF, PER/CENT/MIDLINE: HCPCS

## 2022-12-05 PROCEDURE — 85014 HEMATOCRIT: CPT

## 2022-12-05 PROCEDURE — 96374 THER/PROPH/DIAG INJ IV PUSH: CPT

## 2022-12-05 PROCEDURE — 80048 BASIC METABOLIC PNL TOTAL CA: CPT

## 2022-12-05 PROCEDURE — 71045 X-RAY EXAM CHEST 1 VIEW: CPT

## 2022-12-05 PROCEDURE — 93010 ELECTROCARDIOGRAM REPORT: CPT

## 2022-12-05 PROCEDURE — 36415 COLL VENOUS BLD VENIPUNCTURE: CPT

## 2022-12-05 PROCEDURE — 72158 MRI LUMBAR SPINE W/O & W/DYE: CPT

## 2022-12-05 PROCEDURE — 96375 TX/PRO/DX INJ NEW DRUG ADDON: CPT

## 2022-12-05 PROCEDURE — C9113 INJ PANTOPRAZOLE SODIUM, VIA: HCPCS

## 2022-12-05 PROCEDURE — 84484 ASSAY OF TROPONIN QUANT: CPT

## 2022-12-05 PROCEDURE — 87186 SC STD MICRODIL/AGAR DIL: CPT

## 2022-12-06 LAB
ALBUMIN SERPL BCG-MCNC: 3.6 G/DL (ref 3.4–4.8)
ALP SERPL-CCNC: 103 U/L (ref 40–110)
ALT SERPL W P-5'-P-CCNC: 29 U/L (ref 8–55)
ANION GAP SERPL CALC-SCNC: 12 MMOL/L (ref 10–20)
ANION GAP SERPL CALC-SCNC: 14 MMOL/L (ref 10–20)
APTT PPP: 31.6 SEC (ref 22–33)
AST SERPL-CCNC: 31 U/L (ref 5–34)
BASOPHILS # BLD AUTO: 0 10X3/UL (ref 0–0.2)
BASOPHILS NFR BLD AUTO: 0.1 % (ref 0–2)
BILIRUB SERPL-MCNC: 1.2 MG/DL (ref 0.2–1.2)
BUN SERPL-MCNC: 16 MG/DL (ref 8.4–25.7)
BUN SERPL-MCNC: 17 MG/DL (ref 8.4–25.7)
CALCIUM SERPL-MCNC: 7.6 MG/DL (ref 7.8–10.44)
CALCIUM SERPL-MCNC: 8 MG/DL (ref 7.8–10.44)
CAUTI INDICATIONS FOR CULTURE: (no result)
CHLORIDE SERPL-SCNC: 100 MMOL/L (ref 98–107)
CHLORIDE SERPL-SCNC: 100 MMOL/L (ref 98–107)
CK MB SERPL-MCNC: 0.8 NG/ML (ref 0–6.6)
CO2 SERPL-SCNC: 23 MMOL/L (ref 23–31)
CO2 SERPL-SCNC: 24 MMOL/L (ref 23–31)
CREAT CL PREDICTED SERPL C-G-VRATE: 0 ML/MIN (ref 70–130)
CREAT CL PREDICTED SERPL C-G-VRATE: 0 ML/MIN (ref 70–130)
EOSINOPHIL # BLD AUTO: 0 10X3/UL (ref 0–0.5)
EOSINOPHIL NFR BLD AUTO: 0 % (ref 0–6)
GLOBULIN SER CALC-MCNC: 2.7 G/DL (ref 2.4–3.5)
GLUCOSE SERPL-MCNC: 149 MG/DL (ref 83–110)
GLUCOSE SERPL-MCNC: 154 MG/DL (ref 83–110)
HGB BLD-MCNC: 10.2 G/DL (ref 13.5–17.5)
HGB BLD-MCNC: 10.9 G/DL (ref 13.5–17.5)
HGB BLD-MCNC: 12 G/DL (ref 13.5–17.5)
INR PPP: 1.1
LEUKOCYTE ESTERASE UR QL STRIP.AUTO: 25
LYMPHOCYTES NFR BLD AUTO: 2.3 % (ref 18–47)
MAGNESIUM SERPL-MCNC: 1.3 MG/DL (ref 1.6–2.6)
MCH RBC QN AUTO: 29.6 PG (ref 27–33)
MCH RBC QN AUTO: 29.9 PG (ref 27–33)
MCV RBC AUTO: 83.3 FL (ref 81.2–95.1)
MCV RBC AUTO: 85.7 FL (ref 81.2–95.1)
MDIFF COMPLETE?: YES
MONOCYTES # BLD AUTO: 0.9 10X3/UL (ref 0–1.1)
MONOCYTES NFR BLD AUTO: 4.9 % (ref 0–10)
NEUTROPHILS # BLD AUTO: 16.3 10X3/UL (ref 1.5–8.4)
NEUTROPHILS NFR BLD AUTO: 92.4 % (ref 40–75)
PLATELET # BLD AUTO: 174 10X3/UL (ref 150–450)
PLATELET # BLD AUTO: 179 10X3/UL (ref 150–450)
PLATELET # BLD AUTO: 193 10X3/UL (ref 150–450)
POTASSIUM SERPL-SCNC: 3.2 MMOL/L (ref 3.5–5.1)
POTASSIUM SERPL-SCNC: 3.3 MMOL/L (ref 3.5–5.1)
PROT UR STRIP.AUTO-MCNC: 15 MG/DL
PROT UR STRIP.AUTO-MCNC: 30 MG/DL
PROTHROMBIN TIME: 12.3 SEC (ref 9.5–12.1)
RBC # BLD AUTO: 3.64 10X6/UL (ref 4.32–5.72)
RBC # BLD AUTO: 4.06 10X6/UL (ref 4.32–5.72)
RBC UR QL AUTO: (no result) HPF (ref 0–3)
SODIUM SERPL-SCNC: 133 MMOL/L (ref 136–145)
SODIUM SERPL-SCNC: 134 MMOL/L (ref 136–145)
SP GR UR STRIP: 1 (ref 1–1.03)
SP GR UR STRIP: 1.01 (ref 1–1.03)
TROPONIN I SERPL DL<=0.01 NG/ML-MCNC: 0.77 NG/ML (ref ?–0.03)
TROPONIN I SERPL DL<=0.01 NG/ML-MCNC: 0.87 NG/ML (ref ?–0.03)
WBC # BLD AUTO: 17.6 10X3/UL (ref 3.5–10.5)
WBC # BLD AUTO: 20.1 10X3/UL (ref 3.5–10.5)
WBC UR QL AUTO: (no result) HPF (ref 0–3)

## 2022-12-06 PROCEDURE — 3E03329 INTRODUCTION OF OTHER ANTI-INFECTIVE INTO PERIPHERAL VEIN, PERCUTANEOUS APPROACH: ICD-10-PCS | Performed by: FAMILY MEDICINE

## 2022-12-06 RX ADMIN — ASPIRIN 81 MG CHEWABLE TABLET SCH MG: 81 TABLET CHEWABLE at 08:48

## 2022-12-06 RX ADMIN — POTASSIUM CHLORIDE SCH MLS: 14.9 INJECTION, SOLUTION INTRAVENOUS at 08:00

## 2022-12-06 RX ADMIN — POTASSIUM CHLORIDE SCH MLS: 14.9 INJECTION, SOLUTION INTRAVENOUS at 06:01

## 2022-12-07 LAB
ANION GAP SERPL CALC-SCNC: 13 MMOL/L (ref 10–20)
BASOPHILS # BLD AUTO: 0 10X3/UL (ref 0–0.2)
BASOPHILS NFR BLD AUTO: 0.2 % (ref 0–2)
BUN SERPL-MCNC: 20 MG/DL (ref 8.4–25.7)
CALCIUM SERPL-MCNC: 7.4 MG/DL (ref 7.8–10.44)
CHD RISK SERPL-RTO: 3.2 (ref ?–4.5)
CHLORIDE SERPL-SCNC: 102 MMOL/L (ref 98–107)
CHOLEST SERPL-MCNC: 97 MG/DL
CO2 SERPL-SCNC: 21 MMOL/L (ref 23–31)
CREAT CL PREDICTED SERPL C-G-VRATE: 89 ML/MIN (ref 70–130)
EOSINOPHIL # BLD AUTO: 0 10X3/UL (ref 0–0.5)
EOSINOPHIL NFR BLD AUTO: 0.1 % (ref 0–6)
GLUCOSE SERPL-MCNC: 103 MG/DL (ref 83–110)
HDLC SERPL-MCNC: 30 MG/DL
HGB BLD-MCNC: 10.4 G/DL (ref 13.5–17.5)
LDLC SERPL CALC-MCNC: 42 MG/DL
LYMPHOCYTES NFR BLD AUTO: 5.8 % (ref 18–47)
MAGNESIUM SERPL-MCNC: 1.9 MG/DL (ref 1.6–2.6)
MCH RBC QN AUTO: 28.7 PG (ref 27–33)
MCV RBC AUTO: 84.8 FL (ref 81.2–95.1)
MONOCYTES # BLD AUTO: 1.3 10X3/UL (ref 0–1.1)
MONOCYTES NFR BLD AUTO: 8.7 % (ref 0–10)
NEUTROPHILS # BLD AUTO: 13.1 10X3/UL (ref 1.5–8.4)
NEUTROPHILS NFR BLD AUTO: 84.7 % (ref 40–75)
PLATELET # BLD AUTO: 170 10X3/UL (ref 150–450)
POTASSIUM SERPL-SCNC: 3.3 MMOL/L (ref 3.5–5.1)
RBC # BLD AUTO: 3.63 10X6/UL (ref 4.32–5.72)
SODIUM SERPL-SCNC: 133 MMOL/L (ref 136–145)
TRIGL SERPL-MCNC: 125 MG/DL (ref ?–150)
WBC # BLD AUTO: 15.4 10X3/UL (ref 3.5–10.5)

## 2022-12-07 RX ADMIN — VANCOMYCIN SCH MLS: 1.5 INJECTION, SOLUTION INTRAVENOUS at 05:29

## 2022-12-07 RX ADMIN — HYDROCODONE BITARTRATE AND ACETAMINOPHEN PRN TAB: 5; 325 TABLET ORAL at 20:58

## 2022-12-07 RX ADMIN — ASPIRIN 81 MG CHEWABLE TABLET SCH MG: 81 TABLET CHEWABLE at 09:36

## 2022-12-07 RX ADMIN — ALBUTEROL SULFATE PRN MG: 2.5 SOLUTION RESPIRATORY (INHALATION) at 23:54

## 2022-12-07 RX ADMIN — Medication SCH MG: at 20:58

## 2022-12-07 RX ADMIN — HYDROCODONE BITARTRATE AND ACETAMINOPHEN PRN TAB: 5; 325 TABLET ORAL at 14:53

## 2022-12-08 LAB
ANION GAP SERPL CALC-SCNC: 14 MMOL/L (ref 10–20)
BUN SERPL-MCNC: 16 MG/DL (ref 8.4–25.7)
CALCIUM SERPL-MCNC: 6.9 MG/DL (ref 7.8–10.44)
CHLORIDE SERPL-SCNC: 101 MMOL/L (ref 98–107)
CO2 SERPL-SCNC: 23 MMOL/L (ref 23–31)
CREAT CL PREDICTED SERPL C-G-VRATE: 92 ML/MIN (ref 70–130)
GLUCOSE SERPL-MCNC: 103 MG/DL (ref 83–110)
HGB BLD-MCNC: 10 G/DL (ref 13.5–17.5)
MCH RBC QN AUTO: 28.9 PG (ref 27–33)
MCV RBC AUTO: 85.3 FL (ref 81.2–95.1)
MDIFF COMPLETE?: YES
PLATELET # BLD AUTO: 175 10X3/UL (ref 150–450)
POTASSIUM SERPL-SCNC: 3.2 MMOL/L (ref 3.5–5.1)
RBC # BLD AUTO: 3.46 10X6/UL (ref 4.32–5.72)
SODIUM SERPL-SCNC: 135 MMOL/L (ref 136–145)
VANCOMYCIN TROUGH SERPL-MCNC: 8.5 UG/ML
WBC # BLD AUTO: 9.7 10X3/UL (ref 3.5–10.5)

## 2022-12-08 RX ADMIN — Medication SCH MG: at 21:29

## 2022-12-08 RX ADMIN — Medication SCH MG: at 10:09

## 2022-12-08 RX ADMIN — HYDROCODONE BITARTRATE AND ACETAMINOPHEN PRN TAB: 5; 325 TABLET ORAL at 18:46

## 2022-12-08 RX ADMIN — ALBUTEROL SULFATE PRN MG: 2.5 SOLUTION RESPIRATORY (INHALATION) at 20:44

## 2022-12-08 RX ADMIN — ASPIRIN SCH MG: 81 TABLET ORAL at 21:32

## 2022-12-08 RX ADMIN — VANCOMYCIN SCH: 1.5 INJECTION, SOLUTION INTRAVENOUS at 06:00

## 2022-12-09 LAB
ANION GAP SERPL CALC-SCNC: 12 MMOL/L (ref 10–20)
BASOPHILS # BLD AUTO: 0 10X3/UL (ref 0–0.2)
BASOPHILS NFR BLD AUTO: 0.3 % (ref 0–2)
BUN SERPL-MCNC: 19 MG/DL (ref 8.4–25.7)
CALCIUM SERPL-MCNC: 7.5 MG/DL (ref 7.8–10.44)
CHLORIDE SERPL-SCNC: 100 MMOL/L (ref 98–107)
CO2 SERPL-SCNC: 25 MMOL/L (ref 23–31)
CREAT CL PREDICTED SERPL C-G-VRATE: 97 ML/MIN (ref 70–130)
EOSINOPHIL # BLD AUTO: 0.2 10X3/UL (ref 0–0.5)
EOSINOPHIL NFR BLD AUTO: 2.9 % (ref 0–6)
GLUCOSE SERPL-MCNC: 107 MG/DL (ref 83–110)
HGB BLD-MCNC: 10 G/DL (ref 13.5–17.5)
LYMPHOCYTES NFR BLD AUTO: 16.3 % (ref 18–47)
MCH RBC QN AUTO: 29.5 PG (ref 27–33)
MCV RBC AUTO: 83.2 FL (ref 81.2–95.1)
MONOCYTES # BLD AUTO: 0.8 10X3/UL (ref 0–1.1)
MONOCYTES NFR BLD AUTO: 11.6 % (ref 0–10)
NEUTROPHILS # BLD AUTO: 4.8 10X3/UL (ref 1.5–8.4)
NEUTROPHILS NFR BLD AUTO: 68.2 % (ref 40–75)
PLATELET # BLD AUTO: 197 10X3/UL (ref 150–450)
POTASSIUM SERPL-SCNC: 3.3 MMOL/L (ref 3.5–5.1)
RBC # BLD AUTO: 3.39 10X6/UL (ref 4.32–5.72)
SODIUM SERPL-SCNC: 134 MMOL/L (ref 136–145)
WBC # BLD AUTO: 7 10X3/UL (ref 3.5–10.5)

## 2022-12-09 PROCEDURE — B5181ZA FLUOROSCOPY OF SUPERIOR VENA CAVA USING LOW OSMOLAR CONTRAST, GUIDANCE: ICD-10-PCS | Performed by: RADIOLOGY

## 2022-12-09 PROCEDURE — 02HV33Z INSERTION OF INFUSION DEVICE INTO SUPERIOR VENA CAVA, PERCUTANEOUS APPROACH: ICD-10-PCS | Performed by: RADIOLOGY

## 2022-12-09 PROCEDURE — B548ZZA ULTRASONOGRAPHY OF SUPERIOR VENA CAVA, GUIDANCE: ICD-10-PCS | Performed by: RADIOLOGY

## 2022-12-09 RX ADMIN — VANCOMYCIN HYDROCHLORIDE SCH MLS: 1 INJECTION, POWDER, LYOPHILIZED, FOR SOLUTION INTRAVENOUS at 17:20

## 2022-12-09 RX ADMIN — Medication SCH TAB: at 21:28

## 2022-12-09 RX ADMIN — HYDROCODONE BITARTRATE AND ACETAMINOPHEN PRN TAB: 5; 325 TABLET ORAL at 10:33

## 2022-12-09 RX ADMIN — ALBUTEROL SULFATE PRN MG: 2.5 SOLUTION RESPIRATORY (INHALATION) at 19:27

## 2022-12-09 RX ADMIN — Medication SCH MG: at 21:28

## 2022-12-09 RX ADMIN — Medication SCH MG: at 09:46

## 2022-12-09 RX ADMIN — ASPIRIN SCH MG: 81 TABLET ORAL at 21:28

## 2022-12-09 RX ADMIN — CEFTRIAXONE SCH MLS: 2 INJECTION, POWDER, FOR SOLUTION INTRAMUSCULAR; INTRAVENOUS at 21:28

## 2022-12-09 RX ADMIN — HYDROCODONE BITARTRATE AND ACETAMINOPHEN PRN TAB: 5; 325 TABLET ORAL at 21:29

## 2022-12-09 RX ADMIN — VANCOMYCIN HYDROCHLORIDE SCH MLS: 1 INJECTION, POWDER, LYOPHILIZED, FOR SOLUTION INTRAVENOUS at 05:10

## 2022-12-10 LAB
ANION GAP SERPL CALC-SCNC: 14 MMOL/L (ref 10–20)
BUN SERPL-MCNC: 15 MG/DL (ref 8.4–25.7)
CALCIUM SERPL-MCNC: 8 MG/DL (ref 7.8–10.44)
CHLORIDE SERPL-SCNC: 99 MMOL/L (ref 98–107)
CO2 SERPL-SCNC: 25 MMOL/L (ref 23–31)
CREAT CL PREDICTED SERPL C-G-VRATE: 99 ML/MIN (ref 70–130)
GLUCOSE SERPL-MCNC: 106 MG/DL (ref 83–110)
HGB BLD-MCNC: 10.8 G/DL (ref 13.5–17.5)
PLATELET # BLD AUTO: 207 10X3/UL (ref 150–450)
POTASSIUM SERPL-SCNC: 3.6 MMOL/L (ref 3.5–5.1)
SODIUM SERPL-SCNC: 134 MMOL/L (ref 136–145)

## 2022-12-10 RX ADMIN — Medication SCH MG: at 08:23

## 2022-12-10 RX ADMIN — Medication SCH TAB: at 21:32

## 2022-12-10 RX ADMIN — CEFTRIAXONE SCH MLS: 2 INJECTION, POWDER, FOR SOLUTION INTRAMUSCULAR; INTRAVENOUS at 21:33

## 2022-12-10 RX ADMIN — ASPIRIN SCH MG: 81 TABLET ORAL at 21:32

## 2022-12-10 RX ADMIN — HYDROCODONE BITARTRATE AND ACETAMINOPHEN PRN TAB: 5; 325 TABLET ORAL at 08:24

## 2022-12-10 RX ADMIN — Medication SCH MG: at 21:30

## 2022-12-10 RX ADMIN — HYDROCODONE BITARTRATE AND ACETAMINOPHEN PRN TAB: 5; 325 TABLET ORAL at 21:31

## 2022-12-10 RX ADMIN — HYDROCODONE BITARTRATE AND ACETAMINOPHEN PRN TAB: 5; 325 TABLET ORAL at 08:35

## 2022-12-11 RX ADMIN — Medication SCH MG: at 20:21

## 2022-12-11 RX ADMIN — HYDROCODONE BITARTRATE AND ACETAMINOPHEN PRN TAB: 5; 325 TABLET ORAL at 17:17

## 2022-12-11 RX ADMIN — Medication SCH TAB: at 20:21

## 2022-12-11 RX ADMIN — HYDROCODONE BITARTRATE AND ACETAMINOPHEN PRN TAB: 5; 325 TABLET ORAL at 21:22

## 2022-12-11 RX ADMIN — CEFTRIAXONE SCH MLS: 2 INJECTION, POWDER, FOR SOLUTION INTRAMUSCULAR; INTRAVENOUS at 20:25

## 2022-12-11 RX ADMIN — ASPIRIN SCH MG: 81 TABLET ORAL at 20:23

## 2022-12-11 RX ADMIN — Medication SCH MG: at 08:58

## 2022-12-11 RX ADMIN — HYDROCODONE BITARTRATE AND ACETAMINOPHEN PRN TAB: 5; 325 TABLET ORAL at 08:57

## 2022-12-12 LAB
ANION GAP SERPL CALC-SCNC: 12 MMOL/L (ref 10–20)
BASOPHILS # BLD AUTO: 0 10X3/UL (ref 0–0.2)
BASOPHILS NFR BLD AUTO: 0.1 % (ref 0–2)
BUN SERPL-MCNC: 25 MG/DL (ref 8.4–25.7)
CALCIUM SERPL-MCNC: 8.8 MG/DL (ref 7.8–10.44)
CHLORIDE SERPL-SCNC: 97 MMOL/L (ref 98–107)
CO2 SERPL-SCNC: 25 MMOL/L (ref 23–31)
CREAT CL PREDICTED SERPL C-G-VRATE: 78 ML/MIN (ref 70–130)
CRP SERPL-MCNC: 8.26 MG/DL
EOSINOPHIL # BLD AUTO: 0 10X3/UL (ref 0–0.5)
EOSINOPHIL NFR BLD AUTO: 0.2 % (ref 0–6)
GLUCOSE SERPL-MCNC: 133 MG/DL (ref 83–110)
HGB BLD-MCNC: 11.5 G/DL (ref 13.5–17.5)
LYMPHOCYTES NFR BLD AUTO: 5.9 % (ref 18–47)
MCH RBC QN AUTO: 28.8 PG (ref 27–33)
MCV RBC AUTO: 83.2 FL (ref 81.2–95.1)
MONOCYTES # BLD AUTO: 1.4 10X3/UL (ref 0–1.1)
MONOCYTES NFR BLD AUTO: 8.7 % (ref 0–10)
NEUTROPHILS # BLD AUTO: 13.6 10X3/UL (ref 1.5–8.4)
NEUTROPHILS NFR BLD AUTO: 84 % (ref 40–75)
PLATELET # BLD AUTO: 300 10X3/UL (ref 150–450)
POTASSIUM SERPL-SCNC: 4 MMOL/L (ref 3.5–5.1)
RBC # BLD AUTO: 3.99 10X6/UL (ref 4.32–5.72)
SODIUM SERPL-SCNC: 130 MMOL/L (ref 136–145)
WBC # BLD AUTO: 16.2 10X3/UL (ref 3.5–10.5)

## 2022-12-12 RX ADMIN — Medication SCH MG: at 20:35

## 2022-12-12 RX ADMIN — HYDROCODONE BITARTRATE AND ACETAMINOPHEN PRN TAB: 5; 325 TABLET ORAL at 22:12

## 2022-12-12 RX ADMIN — CEFTRIAXONE SCH MLS: 2 INJECTION, POWDER, FOR SOLUTION INTRAMUSCULAR; INTRAVENOUS at 20:32

## 2022-12-12 RX ADMIN — HYDROCODONE BITARTRATE AND ACETAMINOPHEN PRN TAB: 5; 325 TABLET ORAL at 09:09

## 2022-12-12 RX ADMIN — Medication SCH TAB: at 20:35

## 2022-12-12 RX ADMIN — Medication SCH MG: at 08:59

## 2022-12-12 RX ADMIN — ASPIRIN SCH MG: 81 TABLET ORAL at 20:36

## 2022-12-13 VITALS — TEMPERATURE: 97.5 F | SYSTOLIC BLOOD PRESSURE: 127 MMHG | DIASTOLIC BLOOD PRESSURE: 60 MMHG

## 2022-12-13 LAB
ANION GAP SERPL CALC-SCNC: 12 MMOL/L (ref 10–20)
BASOPHILS # BLD AUTO: 0 10X3/UL (ref 0–0.2)
BASOPHILS NFR BLD AUTO: 0.4 % (ref 0–2)
BUN SERPL-MCNC: 26 MG/DL (ref 8.4–25.7)
CALCIUM SERPL-MCNC: 8.6 MG/DL (ref 7.8–10.44)
CHLORIDE SERPL-SCNC: 95 MMOL/L (ref 98–107)
CO2 SERPL-SCNC: 25 MMOL/L (ref 23–31)
CREAT CL PREDICTED SERPL C-G-VRATE: 87 ML/MIN (ref 70–130)
CRP SERPL-MCNC: 11.83 MG/DL
EOSINOPHIL # BLD AUTO: 0.5 10X3/UL (ref 0–0.5)
EOSINOPHIL NFR BLD AUTO: 7.5 % (ref 0–6)
GLUCOSE SERPL-MCNC: 99 MG/DL (ref 83–110)
HGB BLD-MCNC: 10.4 G/DL (ref 13.5–17.5)
LYMPHOCYTES NFR BLD AUTO: 21.3 % (ref 18–47)
MCH RBC QN AUTO: 29 PG (ref 27–33)
MCV RBC AUTO: 84.1 FL (ref 81.2–95.1)
MONOCYTES # BLD AUTO: 0.9 10X3/UL (ref 0–1.1)
MONOCYTES NFR BLD AUTO: 12.6 % (ref 0–10)
NEUTROPHILS # BLD AUTO: 3.9 10X3/UL (ref 1.5–8.4)
NEUTROPHILS NFR BLD AUTO: 57.5 % (ref 40–75)
PLATELET # BLD AUTO: 290 10X3/UL (ref 150–450)
POTASSIUM SERPL-SCNC: 3.8 MMOL/L (ref 3.5–5.1)
RBC # BLD AUTO: 3.59 10X6/UL (ref 4.32–5.72)
SODIUM SERPL-SCNC: 128 MMOL/L (ref 136–145)
WBC # BLD AUTO: 6.8 10X3/UL (ref 3.5–10.5)

## 2022-12-13 RX ADMIN — Medication SCH MG: at 09:30

## 2023-01-11 ENCOUNTER — HOSPITAL ENCOUNTER (OUTPATIENT)
Dept: HOSPITAL 92 - CSHMRI | Age: 83
Discharge: HOME | End: 2023-01-11
Attending: PHYSICIAN ASSISTANT
Payer: MEDICARE

## 2023-01-11 DIAGNOSIS — C79.51: ICD-10-CM

## 2023-01-11 DIAGNOSIS — M46.46: Primary | ICD-10-CM

## 2023-01-11 DIAGNOSIS — R93.7: ICD-10-CM

## 2023-01-11 DIAGNOSIS — R59.0: ICD-10-CM

## 2023-01-11 PROCEDURE — 72158 MRI LUMBAR SPINE W/O & W/DYE: CPT

## 2023-05-12 ENCOUNTER — HOSPITAL ENCOUNTER (OUTPATIENT)
Dept: HOSPITAL 92 - SCSRAD | Age: 83
Discharge: HOME | End: 2023-05-12
Attending: INTERNAL MEDICINE
Payer: MEDICARE

## 2023-05-12 DIAGNOSIS — R91.1: Primary | ICD-10-CM

## 2023-05-12 DIAGNOSIS — C79.51: ICD-10-CM

## 2023-05-12 DIAGNOSIS — C61: ICD-10-CM

## 2023-05-12 PROCEDURE — 71046 X-RAY EXAM CHEST 2 VIEWS: CPT

## 2023-05-16 ENCOUNTER — HOSPITAL ENCOUNTER (OUTPATIENT)
Dept: HOSPITAL 92 - ONC/OP | Age: 83
Discharge: HOME | End: 2023-05-16
Attending: INTERNAL MEDICINE
Payer: MEDICARE

## 2023-05-16 VITALS — DIASTOLIC BLOOD PRESSURE: 67 MMHG | SYSTOLIC BLOOD PRESSURE: 157 MMHG | TEMPERATURE: 97.7 F

## 2023-05-16 DIAGNOSIS — Z88.1: ICD-10-CM

## 2023-05-16 DIAGNOSIS — D69.6: ICD-10-CM

## 2023-05-16 DIAGNOSIS — D64.9: Primary | ICD-10-CM

## 2023-05-16 DIAGNOSIS — Z88.8: ICD-10-CM

## 2023-05-16 DIAGNOSIS — Z88.0: ICD-10-CM

## 2023-05-16 DIAGNOSIS — Z88.2: ICD-10-CM

## 2023-05-16 PROCEDURE — 86900 BLOOD TYPING SEROLOGIC ABO: CPT

## 2023-05-16 PROCEDURE — 36430 TRANSFUSION BLD/BLD COMPNT: CPT

## 2023-05-16 PROCEDURE — 86901 BLOOD TYPING SEROLOGIC RH(D): CPT

## 2023-05-16 PROCEDURE — 86850 RBC ANTIBODY SCREEN: CPT

## 2023-05-16 PROCEDURE — 30233N1 TRANSFUSION OF NONAUTOLOGOUS RED BLOOD CELLS INTO PERIPHERAL VEIN, PERCUTANEOUS APPROACH: ICD-10-PCS | Performed by: INTERNAL MEDICINE

## 2023-05-16 PROCEDURE — P9016 RBC LEUKOCYTES REDUCED: HCPCS

## 2023-05-16 PROCEDURE — 86920 COMPATIBILITY TEST SPIN: CPT

## 2023-05-18 ENCOUNTER — HOSPITAL ENCOUNTER (OUTPATIENT)
Dept: HOSPITAL 92 - ONC/OP | Age: 83
Discharge: HOME | End: 2023-05-18
Attending: INTERNAL MEDICINE
Payer: MEDICARE

## 2023-05-18 VITALS — DIASTOLIC BLOOD PRESSURE: 62 MMHG | TEMPERATURE: 98.3 F | SYSTOLIC BLOOD PRESSURE: 127 MMHG

## 2023-05-18 DIAGNOSIS — Z88.8: ICD-10-CM

## 2023-05-18 DIAGNOSIS — Z88.0: ICD-10-CM

## 2023-05-18 DIAGNOSIS — D69.6: Primary | ICD-10-CM

## 2023-05-18 DIAGNOSIS — Z88.2: ICD-10-CM

## 2023-05-18 DIAGNOSIS — Z88.1: ICD-10-CM

## 2023-05-18 DIAGNOSIS — D64.9: ICD-10-CM

## 2023-05-18 PROCEDURE — 86901 BLOOD TYPING SEROLOGIC RH(D): CPT

## 2023-05-18 PROCEDURE — 36430 TRANSFUSION BLD/BLD COMPNT: CPT

## 2023-05-18 PROCEDURE — 86900 BLOOD TYPING SEROLOGIC ABO: CPT

## 2023-05-18 PROCEDURE — P9035 PLATELET PHERES LEUKOREDUCED: HCPCS

## 2023-05-18 PROCEDURE — 86850 RBC ANTIBODY SCREEN: CPT

## 2023-07-31 ENCOUNTER — HOSPITAL ENCOUNTER (OUTPATIENT)
Dept: HOSPITAL 92 - SCSMRI | Age: 83
Discharge: HOME | End: 2023-07-31
Attending: INTERNAL MEDICINE
Payer: MEDICARE

## 2023-07-31 DIAGNOSIS — D32.9: Primary | ICD-10-CM

## 2023-07-31 DIAGNOSIS — C79.51: ICD-10-CM

## 2023-07-31 DIAGNOSIS — C61: ICD-10-CM

## 2023-07-31 PROCEDURE — 70553 MRI BRAIN STEM W/O & W/DYE: CPT

## 2023-07-31 PROCEDURE — 82565 ASSAY OF CREATININE: CPT

## 2023-08-01 LAB — EGFRCR SERPLBLD CKD-EPI 2021: 87 ML/MIN/{1.73_M2}

## 2023-10-24 ENCOUNTER — HOSPITAL ENCOUNTER (OUTPATIENT)
Dept: HOSPITAL 92 - SCSMRI | Age: 83
Discharge: HOME | End: 2023-10-24
Attending: INTERNAL MEDICINE
Payer: MEDICARE

## 2023-10-24 DIAGNOSIS — C79.51: ICD-10-CM

## 2023-10-24 DIAGNOSIS — C61: Primary | ICD-10-CM

## 2023-10-24 DIAGNOSIS — H53.8: ICD-10-CM

## 2023-10-24 PROCEDURE — 70553 MRI BRAIN STEM W/O & W/DYE: CPT

## 2023-11-16 ENCOUNTER — HOSPITAL ENCOUNTER (OUTPATIENT)
Dept: HOSPITAL 92 - RAD | Age: 83
Discharge: HOME | End: 2023-11-16
Attending: INTERNAL MEDICINE
Payer: MEDICARE

## 2023-11-16 DIAGNOSIS — R06.00: Primary | ICD-10-CM

## 2023-11-16 DIAGNOSIS — R91.8: ICD-10-CM

## 2023-11-16 PROCEDURE — 71046 X-RAY EXAM CHEST 2 VIEWS: CPT

## 2023-11-24 ENCOUNTER — HOSPITAL ENCOUNTER (INPATIENT)
Dept: HOSPITAL 92 - CSHERS | Age: 83
LOS: 3 days | Discharge: HOME | DRG: 871 | End: 2023-11-27
Attending: STUDENT IN AN ORGANIZED HEALTH CARE EDUCATION/TRAINING PROGRAM | Admitting: STUDENT IN AN ORGANIZED HEALTH CARE EDUCATION/TRAINING PROGRAM
Payer: MEDICARE

## 2023-11-24 VITALS — BODY MASS INDEX: 30.7 KG/M2

## 2023-11-24 DIAGNOSIS — Z98.890: ICD-10-CM

## 2023-11-24 DIAGNOSIS — I25.2: ICD-10-CM

## 2023-11-24 DIAGNOSIS — Z88.8: ICD-10-CM

## 2023-11-24 DIAGNOSIS — R04.2: ICD-10-CM

## 2023-11-24 DIAGNOSIS — J18.9: ICD-10-CM

## 2023-11-24 DIAGNOSIS — N40.0: ICD-10-CM

## 2023-11-24 DIAGNOSIS — Z79.899: ICD-10-CM

## 2023-11-24 DIAGNOSIS — Z11.52: ICD-10-CM

## 2023-11-24 DIAGNOSIS — J96.01: ICD-10-CM

## 2023-11-24 DIAGNOSIS — I10: ICD-10-CM

## 2023-11-24 DIAGNOSIS — Z90.49: ICD-10-CM

## 2023-11-24 DIAGNOSIS — E87.20: ICD-10-CM

## 2023-11-24 DIAGNOSIS — D64.9: ICD-10-CM

## 2023-11-24 DIAGNOSIS — I25.10: ICD-10-CM

## 2023-11-24 DIAGNOSIS — Z79.82: ICD-10-CM

## 2023-11-24 DIAGNOSIS — R65.20: ICD-10-CM

## 2023-11-24 DIAGNOSIS — I48.91: ICD-10-CM

## 2023-11-24 DIAGNOSIS — Z88.0: ICD-10-CM

## 2023-11-24 DIAGNOSIS — A41.9: Primary | ICD-10-CM

## 2023-11-24 DIAGNOSIS — Z79.01: ICD-10-CM

## 2023-11-24 DIAGNOSIS — E78.5: ICD-10-CM

## 2023-11-24 LAB
ALBUMIN SERPL BCG-MCNC: 4 G/DL (ref 3.4–4.8)
ALP SERPL-CCNC: 94 U/L (ref 40–110)
ALT SERPL W P-5'-P-CCNC: 11 U/L (ref 8–55)
ANALYZER IN CARDIO: (no result)
ANION GAP SERPL CALC-SCNC: 21 MMOL/L (ref 10–20)
APTT PPP: 26.8 SEC (ref 22–33)
AST SERPL-CCNC: 20 U/L (ref 5–34)
BASE EXCESS STD BLDA CALC-SCNC: -1.6 MEQ/L
BASOPHILS # BLD AUTO: 0 10X3/UL (ref 0–0.2)
BASOPHILS NFR BLD AUTO: 0.3 % (ref 0–2)
BILIRUB SERPL-MCNC: 0.6 MG/DL (ref 0.2–1.2)
BUN SERPL-MCNC: 18 MG/DL (ref 8.4–25.7)
CA-I BLDA-SCNC: 1.1 MMOL/L (ref 1.12–1.3)
CALCIUM SERPL-MCNC: 8.8 MG/DL (ref 7.8–10.44)
CAUTI INDICATIONS FOR CULTURE: (no result)
CHLORIDE SERPL-SCNC: 102 MMOL/L (ref 98–107)
CO2 SERPL-SCNC: 18 MMOL/L (ref 23–31)
CREAT CL PREDICTED SERPL C-G-VRATE: 0 ML/MIN (ref 70–130)
CRP SERPL-MCNC: 0.87 MG/DL
D DIMER PPP FEU-MCNC: 0.43 MG/L FEU (ref 0.19–0.5)
EOSINOPHIL # BLD AUTO: 0.1 10X3/UL (ref 0–0.5)
EOSINOPHIL NFR BLD AUTO: 1.1 % (ref 0–6)
GLOBULIN SER CALC-MCNC: 3.3 G/DL (ref 2.4–3.5)
GLUCOSE SERPL-MCNC: 117 MG/DL (ref 83–110)
HCO3 BLDA-SCNC: 22.9 MEQ/L (ref 22–28)
HCT VFR BLD CALC: 42.8 % (ref 38.8–50)
HCT VFR BLDA CALC: 41 % (ref 42–52)
HGB BLD-MCNC: 14.3 G/DL (ref 13.5–17.5)
HGB BLDA-MCNC: 13.9 G/DL (ref 14–18)
INR PPP: 1
LIPASE SERPL-CCNC: 24 U/L (ref 8–78)
LYMPHOCYTES NFR BLD AUTO: 11.5 % (ref 18–47)
MAGNESIUM SERPL-MCNC: 1.5 MG/DL (ref 1.6–2.6)
MCH RBC QN AUTO: 31.2 PG (ref 27–33)
MCV RBC AUTO: 93.2 FL (ref 81.2–95.1)
MONOCYTES # BLD AUTO: 0.1 10X3/UL (ref 0–1.1)
MONOCYTES NFR BLD AUTO: 2.1 % (ref 0–10)
NEUTROPHILS # BLD AUTO: 5.5 10X3/UL (ref 1.5–8.4)
NEUTROPHILS NFR BLD AUTO: 84.7 % (ref 40–75)
O2 A-A PPRESDIFF RESPIRATORY: 466.85 MMHG (ref 0–20)
PCO2 BLDA: 38.2 MMHG (ref 35–45)
PH BLDA: 7.4 [PH] (ref 7.35–7.45)
PLATELET # BLD AUTO: 250 10X3/UL (ref 150–450)
PO2 BLDA: 198.4 MMHG (ref 60–?)
POTASSIUM BLD-SCNC: 4.23 MMOL/L (ref 3.7–5.3)
POTASSIUM SERPL-SCNC: 4.5 MMOL/L (ref 3.5–5.1)
PROTHROMBIN TIME: 11.1 SEC (ref 9.5–12.1)
RBC # BLD AUTO: 4.59 10X6/UL (ref 4.32–5.72)
SODIUM SERPL-SCNC: 136 MMOL/L (ref 136–145)
SP GR UR STRIP: 1.01 (ref 1–1.03)
SPECIMEN DRAWN FROM PATIENT: (no result)
TROPONIN I SERPL DL<=0.01 NG/ML-MCNC: 0.01 NG/ML (ref ?–0.03)
TROPONIN I SERPL DL<=0.01 NG/ML-MCNC: 0.02 NG/ML (ref ?–0.03)
TROPONIN I SERPL DL<=0.01 NG/ML-MCNC: 0.02 NG/ML (ref ?–0.03)
WBC # BLD AUTO: 6.5 10X3/UL (ref 3.5–10.5)

## 2023-11-24 PROCEDURE — 36416 COLLJ CAPILLARY BLOOD SPEC: CPT

## 2023-11-24 PROCEDURE — 93005 ELECTROCARDIOGRAM TRACING: CPT

## 2023-11-24 PROCEDURE — 87081 CULTURE SCREEN ONLY: CPT

## 2023-11-24 PROCEDURE — 86900 BLOOD TYPING SEROLOGIC ABO: CPT

## 2023-11-24 PROCEDURE — 80053 COMPREHEN METABOLIC PANEL: CPT

## 2023-11-24 PROCEDURE — 81001 URINALYSIS AUTO W/SCOPE: CPT

## 2023-11-24 PROCEDURE — 83735 ASSAY OF MAGNESIUM: CPT

## 2023-11-24 PROCEDURE — 87633 RESP VIRUS 12-25 TARGETS: CPT

## 2023-11-24 PROCEDURE — 5A09357 ASSISTANCE WITH RESPIRATORY VENTILATION, LESS THAN 24 CONSECUTIVE HOURS, CONTINUOUS POSITIVE AIRWAY PRESSURE: ICD-10-PCS | Performed by: STUDENT IN AN ORGANIZED HEALTH CARE EDUCATION/TRAINING PROGRAM

## 2023-11-24 PROCEDURE — 86140 C-REACTIVE PROTEIN: CPT

## 2023-11-24 PROCEDURE — 87070 CULTURE OTHR SPECIMN AEROBIC: CPT

## 2023-11-24 PROCEDURE — 80048 BASIC METABOLIC PNL TOTAL CA: CPT

## 2023-11-24 PROCEDURE — 86850 RBC ANTIBODY SCREEN: CPT

## 2023-11-24 PROCEDURE — 87798 DETECT AGENT NOS DNA AMP: CPT

## 2023-11-24 PROCEDURE — 87086 URINE CULTURE/COLONY COUNT: CPT

## 2023-11-24 PROCEDURE — 36600 WITHDRAWAL OF ARTERIAL BLOOD: CPT

## 2023-11-24 PROCEDURE — 85025 COMPLETE CBC W/AUTO DIFF WBC: CPT

## 2023-11-24 PROCEDURE — 85379 FIBRIN DEGRADATION QUANT: CPT

## 2023-11-24 PROCEDURE — 85610 PROTHROMBIN TIME: CPT

## 2023-11-24 PROCEDURE — 71250 CT THORAX DX C-: CPT

## 2023-11-24 PROCEDURE — 82805 BLOOD GASES W/O2 SATURATION: CPT

## 2023-11-24 PROCEDURE — 4A033R1 MEASUREMENT OF ARTERIAL SATURATION, PERIPHERAL, PERCUTANEOUS APPROACH: ICD-10-PCS | Performed by: STUDENT IN AN ORGANIZED HEALTH CARE EDUCATION/TRAINING PROGRAM

## 2023-11-24 PROCEDURE — 83690 ASSAY OF LIPASE: CPT

## 2023-11-24 PROCEDURE — 94760 N-INVAS EAR/PLS OXIMETRY 1: CPT

## 2023-11-24 PROCEDURE — 3E03329 INTRODUCTION OF OTHER ANTI-INFECTIVE INTO PERIPHERAL VEIN, PERCUTANEOUS APPROACH: ICD-10-PCS | Performed by: STUDENT IN AN ORGANIZED HEALTH CARE EDUCATION/TRAINING PROGRAM

## 2023-11-24 PROCEDURE — 87040 BLOOD CULTURE FOR BACTERIA: CPT

## 2023-11-24 PROCEDURE — 36415 COLL VENOUS BLD VENIPUNCTURE: CPT

## 2023-11-24 PROCEDURE — 84484 ASSAY OF TROPONIN QUANT: CPT

## 2023-11-24 PROCEDURE — 80202 ASSAY OF VANCOMYCIN: CPT

## 2023-11-24 PROCEDURE — 71045 X-RAY EXAM CHEST 1 VIEW: CPT

## 2023-11-24 PROCEDURE — 87205 SMEAR GRAM STAIN: CPT

## 2023-11-24 PROCEDURE — 85730 THROMBOPLASTIN TIME PARTIAL: CPT

## 2023-11-24 PROCEDURE — 83605 ASSAY OF LACTIC ACID: CPT

## 2023-11-24 PROCEDURE — 94660 CPAP INITIATION&MGMT: CPT

## 2023-11-24 PROCEDURE — 86901 BLOOD TYPING SEROLOGIC RH(D): CPT

## 2023-11-24 PROCEDURE — 84145 PROCALCITONIN (PCT): CPT

## 2023-11-25 LAB
ANION GAP SERPL CALC-SCNC: 14 MMOL/L (ref 10–20)
BASOPHILS # BLD AUTO: 0 10X3/UL (ref 0–0.2)
BASOPHILS NFR BLD AUTO: 0.2 % (ref 0–2)
BUN SERPL-MCNC: 16 MG/DL (ref 8.4–25.7)
CALCIUM SERPL-MCNC: 7.7 MG/DL (ref 7.8–10.44)
CHLORIDE SERPL-SCNC: 104 MMOL/L (ref 98–107)
CO2 SERPL-SCNC: 22 MMOL/L (ref 23–31)
CREAT CL PREDICTED SERPL C-G-VRATE: 87 ML/MIN (ref 70–130)
EOSINOPHIL # BLD AUTO: 0.1 10X3/UL (ref 0–0.5)
EOSINOPHIL NFR BLD AUTO: 0.4 % (ref 0–6)
GLUCOSE SERPL-MCNC: 125 MG/DL (ref 83–110)
HCT VFR BLD CALC: 30.7 % (ref 38.8–50)
HGB BLD-MCNC: 10.4 G/DL (ref 13.5–17.5)
LYMPHOCYTES NFR BLD AUTO: 7.5 % (ref 18–47)
MCH RBC QN AUTO: 31.7 PG (ref 27–33)
MCV RBC AUTO: 93.6 FL (ref 81.2–95.1)
MONOCYTES # BLD AUTO: 0.6 10X3/UL (ref 0–1.1)
MONOCYTES NFR BLD AUTO: 3.8 % (ref 0–10)
NEUTROPHILS # BLD AUTO: 14 10X3/UL (ref 1.5–8.4)
NEUTROPHILS NFR BLD AUTO: 87.5 % (ref 40–75)
PLATELET # BLD AUTO: 193 10X3/UL (ref 150–450)
POTASSIUM SERPL-SCNC: 4.2 MMOL/L (ref 3.5–5.1)
RBC # BLD AUTO: 3.28 10X6/UL (ref 4.32–5.72)
SODIUM SERPL-SCNC: 136 MMOL/L (ref 136–145)
WBC # BLD AUTO: 16 10X3/UL (ref 3.5–10.5)

## 2023-11-25 RX ADMIN — VANCOMYCIN SCH MLS: 1.5 INJECTION, SOLUTION INTRAVENOUS at 06:26

## 2023-11-26 LAB
ANION GAP SERPL CALC-SCNC: 13 MMOL/L (ref 10–20)
BASOPHILS # BLD AUTO: 0 10X3/UL (ref 0–0.2)
BASOPHILS NFR BLD AUTO: 0.2 % (ref 0–2)
BUN SERPL-MCNC: 10 MG/DL (ref 8.4–25.7)
CALCIUM SERPL-MCNC: 7.9 MG/DL (ref 7.8–10.44)
CHLORIDE SERPL-SCNC: 105 MMOL/L (ref 98–107)
CO2 SERPL-SCNC: 22 MMOL/L (ref 23–31)
CREAT CL PREDICTED SERPL C-G-VRATE: 96 ML/MIN (ref 70–130)
EOSINOPHIL # BLD AUTO: 0.1 10X3/UL (ref 0–0.5)
EOSINOPHIL NFR BLD AUTO: 1.2 % (ref 0–6)
GLUCOSE SERPL-MCNC: 107 MG/DL (ref 83–110)
HCT VFR BLD CALC: 29.4 % (ref 38.8–50)
HGB BLD-MCNC: 9.9 G/DL (ref 13.5–17.5)
LYMPHOCYTES NFR BLD AUTO: 9.6 % (ref 18–47)
MCH RBC QN AUTO: 31.4 PG (ref 27–33)
MCV RBC AUTO: 93.3 FL (ref 81.2–95.1)
MONOCYTES # BLD AUTO: 0.5 10X3/UL (ref 0–1.1)
MONOCYTES NFR BLD AUTO: 5.2 % (ref 0–10)
NEUTROPHILS # BLD AUTO: 8.7 10X3/UL (ref 1.5–8.4)
NEUTROPHILS NFR BLD AUTO: 83.3 % (ref 40–75)
PLATELET # BLD AUTO: 197 10X3/UL (ref 150–450)
POTASSIUM SERPL-SCNC: 3.7 MMOL/L (ref 3.5–5.1)
RBC # BLD AUTO: 3.15 10X6/UL (ref 4.32–5.72)
SODIUM SERPL-SCNC: 136 MMOL/L (ref 136–145)
VANCOMYCIN TROUGH SERPL-MCNC: 11.9 UG/ML
WBC # BLD AUTO: 10.4 10X3/UL (ref 3.5–10.5)

## 2023-11-26 RX ADMIN — VANCOMYCIN SCH MLS: 1.5 INJECTION, SOLUTION INTRAVENOUS at 01:30

## 2023-11-26 RX ADMIN — VANCOMYCIN SCH MLS: 1.5 INJECTION, SOLUTION INTRAVENOUS at 17:35

## 2023-11-27 VITALS — SYSTOLIC BLOOD PRESSURE: 154 MMHG | DIASTOLIC BLOOD PRESSURE: 62 MMHG

## 2023-11-27 VITALS — TEMPERATURE: 98 F

## 2023-11-27 LAB
ANION GAP SERPL CALC-SCNC: 13 MMOL/L (ref 10–20)
BASOPHILS # BLD AUTO: 0 10X3/UL (ref 0–0.2)
BASOPHILS NFR BLD AUTO: 0.3 % (ref 0–2)
BUN SERPL-MCNC: 9 MG/DL (ref 8.4–25.7)
CALCIUM SERPL-MCNC: 7.7 MG/DL (ref 7.8–10.44)
CHLORIDE SERPL-SCNC: 107 MMOL/L (ref 98–107)
CO2 SERPL-SCNC: 17 MMOL/L (ref 23–31)
CREAT CL PREDICTED SERPL C-G-VRATE: 102 ML/MIN (ref 70–130)
EOSINOPHIL # BLD AUTO: 0.2 10X3/UL (ref 0–0.5)
EOSINOPHIL NFR BLD AUTO: 1.9 % (ref 0–6)
GLUCOSE SERPL-MCNC: 102 MG/DL (ref 83–110)
HCT VFR BLD CALC: 31.5 % (ref 38.8–50)
HGB BLD-MCNC: 10.4 G/DL (ref 13.5–17.5)
LYMPHOCYTES NFR BLD AUTO: 11.6 % (ref 18–47)
MCH RBC QN AUTO: 32.6 PG (ref 27–33)
MCV RBC AUTO: 98.7 FL (ref 81.2–95.1)
MONOCYTES # BLD AUTO: 0.6 10X3/UL (ref 0–1.1)
MONOCYTES NFR BLD AUTO: 6.5 % (ref 0–10)
NEUTROPHILS # BLD AUTO: 6.8 10X3/UL (ref 1.5–8.4)
NEUTROPHILS NFR BLD AUTO: 79.4 % (ref 40–75)
PLATELET # BLD AUTO: 200 10X3/UL (ref 150–450)
POTASSIUM SERPL-SCNC: 3.8 MMOL/L (ref 3.5–5.1)
RBC # BLD AUTO: 3.19 10X6/UL (ref 4.32–5.72)
SODIUM SERPL-SCNC: 133 MMOL/L (ref 136–145)
WBC # BLD AUTO: 8.6 10X3/UL (ref 3.5–10.5)

## 2023-12-20 ENCOUNTER — HOSPITAL ENCOUNTER (OUTPATIENT)
Dept: HOSPITAL 92 - CSHERS | Age: 83
Setting detail: OBSERVATION
LOS: 1 days | Discharge: HOME | End: 2023-12-21
Attending: NURSE PRACTITIONER | Admitting: INTERNAL MEDICINE
Payer: MEDICARE

## 2023-12-20 VITALS — BODY MASS INDEX: 27.8 KG/M2

## 2023-12-20 DIAGNOSIS — C79.9: ICD-10-CM

## 2023-12-20 DIAGNOSIS — I63.81: Primary | ICD-10-CM

## 2023-12-20 DIAGNOSIS — I10: ICD-10-CM

## 2023-12-20 DIAGNOSIS — E78.5: ICD-10-CM

## 2023-12-20 DIAGNOSIS — Z87.891: ICD-10-CM

## 2023-12-20 DIAGNOSIS — C61: ICD-10-CM

## 2023-12-20 DIAGNOSIS — Z88.2: ICD-10-CM

## 2023-12-20 DIAGNOSIS — I25.10: ICD-10-CM

## 2023-12-20 DIAGNOSIS — Z88.0: ICD-10-CM

## 2023-12-20 DIAGNOSIS — Z88.8: ICD-10-CM

## 2023-12-20 DIAGNOSIS — I25.2: ICD-10-CM

## 2023-12-20 DIAGNOSIS — Z79.899: ICD-10-CM

## 2023-12-20 DIAGNOSIS — I48.0: ICD-10-CM

## 2023-12-20 DIAGNOSIS — Z90.49: ICD-10-CM

## 2023-12-20 LAB
ALBUMIN SERPL BCG-MCNC: 3.6 G/DL (ref 3.4–4.8)
ALP SERPL-CCNC: 66 U/L (ref 40–110)
ALT SERPL W P-5'-P-CCNC: 10 U/L (ref 8–55)
ANION GAP SERPL CALC-SCNC: 13 MMOL/L (ref 10–20)
AST SERPL-CCNC: 14 U/L (ref 5–34)
BACTERIA UR QL AUTO: (no result) HPF
BASOPHILS # BLD AUTO: 0 10X3/UL (ref 0–0.2)
BASOPHILS NFR BLD AUTO: 0.3 % (ref 0–2)
BILIRUB SERPL-MCNC: 0.5 MG/DL (ref 0.2–1.2)
BUN SERPL-MCNC: 11 MG/DL (ref 8.4–25.7)
CALCIUM SERPL-MCNC: 7.4 MG/DL (ref 7.8–10.44)
CAUTI INDICATIONS FOR CULTURE: (no result)
CHLORIDE SERPL-SCNC: 109 MMOL/L (ref 98–107)
CO2 SERPL-SCNC: 21 MMOL/L (ref 23–31)
CREAT CL PREDICTED SERPL C-G-VRATE: 0 ML/MIN (ref 70–130)
EOSINOPHIL # BLD AUTO: 0.2 10X3/UL (ref 0–0.5)
EOSINOPHIL NFR BLD AUTO: 3.1 % (ref 0–6)
GLOBULIN SER CALC-MCNC: 2.2 G/DL (ref 2.4–3.5)
GLUCOSE SERPL-MCNC: 129 MG/DL (ref 83–110)
HCT VFR BLD CALC: 37 % (ref 38.8–50)
HGB BLD-MCNC: 12.4 G/DL (ref 13.5–17.5)
LYMPHOCYTES NFR BLD AUTO: 14.7 % (ref 18–47)
MCH RBC QN AUTO: 31.9 PG (ref 27–33)
MCV RBC AUTO: 95.1 FL (ref 81.2–95.1)
MONOCYTES # BLD AUTO: 0.6 10X3/UL (ref 0–1.1)
MONOCYTES NFR BLD AUTO: 8.8 % (ref 0–10)
NEUTROPHILS # BLD AUTO: 4.9 10X3/UL (ref 1.5–8.4)
NEUTROPHILS NFR BLD AUTO: 72.7 % (ref 40–75)
PLATELET # BLD AUTO: 177 10X3/UL (ref 150–450)
POTASSIUM SERPL-SCNC: 4 MMOL/L (ref 3.5–5.1)
RBC # BLD AUTO: 3.89 10X6/UL (ref 4.32–5.72)
SODIUM SERPL-SCNC: 139 MMOL/L (ref 136–145)
SP GR UR STRIP: 1.01 (ref 1–1.03)
TROPONIN I SERPL DL<=0.01 NG/ML-MCNC: (no result) NG/ML (ref ?–0.03)
WBC # BLD AUTO: 6.7 10X3/UL (ref 3.5–10.5)
WBC UR QL AUTO: (no result) HPF (ref 0–3)

## 2023-12-20 PROCEDURE — 71045 X-RAY EXAM CHEST 1 VIEW: CPT

## 2023-12-20 PROCEDURE — 80053 COMPREHEN METABOLIC PANEL: CPT

## 2023-12-20 PROCEDURE — 96372 THER/PROPH/DIAG INJ SC/IM: CPT

## 2023-12-20 PROCEDURE — 94760 N-INVAS EAR/PLS OXIMETRY 1: CPT

## 2023-12-20 PROCEDURE — 97535 SELF CARE MNGMENT TRAINING: CPT

## 2023-12-20 PROCEDURE — 97116 GAIT TRAINING THERAPY: CPT

## 2023-12-20 PROCEDURE — 80048 BASIC METABOLIC PNL TOTAL CA: CPT

## 2023-12-20 PROCEDURE — 85025 COMPLETE CBC W/AUTO DIFF WBC: CPT

## 2023-12-20 PROCEDURE — 80061 LIPID PANEL: CPT

## 2023-12-20 PROCEDURE — 70551 MRI BRAIN STEM W/O DYE: CPT

## 2023-12-20 PROCEDURE — 70450 CT HEAD/BRAIN W/O DYE: CPT

## 2023-12-20 PROCEDURE — G0378 HOSPITAL OBSERVATION PER HR: HCPCS

## 2023-12-20 PROCEDURE — 84484 ASSAY OF TROPONIN QUANT: CPT

## 2023-12-20 PROCEDURE — 93005 ELECTROCARDIOGRAM TRACING: CPT

## 2023-12-20 PROCEDURE — 97530 THERAPEUTIC ACTIVITIES: CPT

## 2023-12-20 PROCEDURE — 36415 COLL VENOUS BLD VENIPUNCTURE: CPT

## 2023-12-20 PROCEDURE — 70498 CT ANGIOGRAPHY NECK: CPT

## 2023-12-20 PROCEDURE — 70496 CT ANGIOGRAPHY HEAD: CPT

## 2023-12-20 PROCEDURE — 99285 EMERGENCY DEPT VISIT HI MDM: CPT

## 2023-12-20 PROCEDURE — 81001 URINALYSIS AUTO W/SCOPE: CPT

## 2023-12-21 VITALS — SYSTOLIC BLOOD PRESSURE: 152 MMHG | DIASTOLIC BLOOD PRESSURE: 71 MMHG

## 2023-12-21 VITALS — TEMPERATURE: 99.8 F

## 2023-12-21 LAB
ANION GAP SERPL CALC-SCNC: 13 MMOL/L (ref 10–20)
BASOPHILS # BLD AUTO: 0 10X3/UL (ref 0–0.2)
BASOPHILS NFR BLD AUTO: 0.2 % (ref 0–2)
BUN SERPL-MCNC: 8 MG/DL (ref 8.4–25.7)
CALCIUM SERPL-MCNC: 7.6 MG/DL (ref 7.8–10.44)
CHD RISK SERPL-RTO: 3.6 (ref ?–4.5)
CHLORIDE SERPL-SCNC: 111 MMOL/L (ref 98–107)
CHOLEST SERPL-MCNC: 148 MG/DL
CO2 SERPL-SCNC: 16 MMOL/L (ref 23–31)
CREAT CL PREDICTED SERPL C-G-VRATE: 96 ML/MIN (ref 70–130)
EOSINOPHIL # BLD AUTO: 0.2 10X3/UL (ref 0–0.5)
EOSINOPHIL NFR BLD AUTO: 3 % (ref 0–6)
GLUCOSE SERPL-MCNC: 114 MG/DL (ref 83–110)
HCT VFR BLD CALC: 32.9 % (ref 38.8–50)
HDLC SERPL-MCNC: 41 MG/DL
HGB BLD-MCNC: 11.4 G/DL (ref 13.5–17.5)
LDLC SERPL CALC-MCNC: 80 MG/DL
LYMPHOCYTES NFR BLD AUTO: 16.9 % (ref 18–47)
MCH RBC QN AUTO: 31.8 PG (ref 27–33)
MCV RBC AUTO: 91.6 FL (ref 81.2–95.1)
MONOCYTES # BLD AUTO: 0.7 10X3/UL (ref 0–1.1)
MONOCYTES NFR BLD AUTO: 10.9 % (ref 0–10)
NEUTROPHILS # BLD AUTO: 4.4 10X3/UL (ref 1.5–8.4)
NEUTROPHILS NFR BLD AUTO: 68.8 % (ref 40–75)
PLATELET # BLD AUTO: 149 10X3/UL (ref 150–450)
POTASSIUM SERPL-SCNC: 3.9 MMOL/L (ref 3.5–5.1)
RBC # BLD AUTO: 3.59 10X6/UL (ref 4.32–5.72)
SODIUM SERPL-SCNC: 136 MMOL/L (ref 136–145)
TRIGL SERPL-MCNC: 135 MG/DL (ref ?–150)
WBC # BLD AUTO: 6.4 10X3/UL (ref 3.5–10.5)

## 2024-02-15 ENCOUNTER — HOSPITAL ENCOUNTER (EMERGENCY)
Dept: HOSPITAL 92 - CSHERS | Age: 84
LOS: 1 days | Discharge: HOME | End: 2024-02-16
Payer: MEDICARE

## 2024-02-15 DIAGNOSIS — Z87.891: ICD-10-CM

## 2024-02-15 DIAGNOSIS — R54: ICD-10-CM
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
## 2024-02-15 DIAGNOSIS — Z79.899: ICD-10-CM

## 2024-02-15 DIAGNOSIS — C61: ICD-10-CM

## 2024-02-15 DIAGNOSIS — I10: ICD-10-CM

## 2024-02-15 DIAGNOSIS — S00.11XA: Primary | ICD-10-CM

## 2024-02-15 LAB
ALBUMIN SERPL BCG-MCNC: 3.7 G/DL (ref 3.4–4.8)
ALP SERPL-CCNC: 61 U/L (ref 40–110)
ALT SERPL W P-5'-P-CCNC: 7 U/L (ref 8–55)
ANION GAP SERPL CALC-SCNC: 12 MMOL/L (ref 10–20)
AST SERPL-CCNC: 12 U/L (ref 5–34)
BASOPHILS # BLD AUTO: 0 10X3/UL (ref 0–0.2)
BASOPHILS NFR BLD AUTO: 0.3 % (ref 0–2)
BILIRUB SERPL-MCNC: 0.4 MG/DL (ref 0.2–1.2)
BUN SERPL-MCNC: 15 MG/DL (ref 8.4–25.7)
CALCIUM SERPL-MCNC: 8.4 MG/DL (ref 7.8–10.44)
CHLORIDE SERPL-SCNC: 103 MMOL/L (ref 98–107)
CO2 SERPL-SCNC: 24 MMOL/L (ref 23–31)
CREAT CL PREDICTED SERPL C-G-VRATE: 0 ML/MIN (ref 70–130)
EOSINOPHIL # BLD AUTO: 0.2 10X3/UL (ref 0–0.5)
EOSINOPHIL NFR BLD AUTO: 2.6 % (ref 0–6)
GLOBULIN SER CALC-MCNC: 2.4 G/DL (ref 2.4–3.5)
GLUCOSE SERPL-MCNC: 176 MG/DL (ref 83–110)
HCT VFR BLD CALC: 35.3 % (ref 38.8–50)
HGB BLD-MCNC: 12.1 G/DL (ref 13.5–17.5)
LYMPHOCYTES NFR BLD AUTO: 14.7 % (ref 18–47)
MAGNESIUM SERPL-MCNC: 1.6 MG/DL (ref 1.6–2.6)
MCH RBC QN AUTO: 31.3 PG (ref 27–33)
MCV RBC AUTO: 91.5 FL (ref 81.2–95.1)
MONOCYTES # BLD AUTO: 0.4 10X3/UL (ref 0–1.1)
MONOCYTES NFR BLD AUTO: 6.7 % (ref 0–10)
NEUTROPHILS # BLD AUTO: 4.6 10X3/UL (ref 1.5–8.4)
NEUTROPHILS NFR BLD AUTO: 75.2 % (ref 40–75)
PLATELET # BLD AUTO: 212 10X3/UL (ref 150–450)
POTASSIUM SERPL-SCNC: 3.8 MMOL/L (ref 3.5–5.1)
RBC # BLD AUTO: 3.86 10X6/UL (ref 4.32–5.72)
SODIUM SERPL-SCNC: 135 MMOL/L (ref 136–145)
WBC # BLD AUTO: 6.1 10X3/UL (ref 3.5–10.5)

## 2024-02-15 PROCEDURE — 70450 CT HEAD/BRAIN W/O DYE: CPT

## 2024-02-15 PROCEDURE — 80053 COMPREHEN METABOLIC PANEL: CPT

## 2024-02-15 PROCEDURE — 93005 ELECTROCARDIOGRAM TRACING: CPT

## 2024-02-15 PROCEDURE — 36415 COLL VENOUS BLD VENIPUNCTURE: CPT

## 2024-02-15 PROCEDURE — 83605 ASSAY OF LACTIC ACID: CPT

## 2024-02-15 PROCEDURE — 83735 ASSAY OF MAGNESIUM: CPT

## 2024-02-15 PROCEDURE — 85025 COMPLETE CBC W/AUTO DIFF WBC: CPT

## 2024-03-19 NOTE — CT
CT CHEST WITH CONTRAST:

CT ABDOMEN WITH CONTRAST:

CT PELVIS WITH CONTRAST:



HISTORY:

Prostate cancer. Osseous metastases.



CORRELATION:

None.



COMPARISON:

07/23/2019



FINDINGS:



CHEST:

Mediastinum: No mass, lymphadenopathy or hematoma.

Aorta: Thoracic aorta and abdominal aorta have a normal caliber. Atherosclerosis. No aneurysm or joseph
aortic fat stranding

Heart: Normal heart size. No significant pericardial fluid. There are coronary artery calcifications.


Trachea and central bronchi: Patent

Pleural spaces: No pleural effusion

Right lung: No masses or consolidation. No suspicious nodules. Dependent atelectatic changes.

Left lung:No masses or consolidation. No suspicious nodules. Dependent atelectatic changes.

Pneumothorax: None.



ABDOMEN:

Gallbladder: Surgically absent

Portal vein: Patent

Liver: Appropriate enhancement. 3.5 cm

Spleen: Appropriate enhancement

Pancreas: Appropriate enhancement

Adrenal glands: Appropriate enhancement

Lymphadenopathy: There is periaortic and aortocaval lymphadenopathy. Conglomeration of enlarged left 
periaortic lymph node measures 4.6 cm anterior-posterior by 3.6 cm mediolateral, just inferior to

the origin of the renal arteries. Additional bulky lymphadenopathy is noted along both common iliac c
hains. Conglomeration of enlarged right iliac lymph node measures 2.7 x 3.4 cm. Conglomeration of

enlarged right iliac lymph node measures 3.4 x 2.7 cm. Previously, the largest conglomeration of lymp
h nodes in left aortic region measured 6.1 x 4.8 cm. There was a large aortocaval lymph node that

previously measured 3.9 x 4.1 cm. There has also been interval decrease in size of lymphadenopathy al
jay the left and right common iliac chain.

Kidneys: There is a right-sided percutaneous nephrostomy. No evidence of obstructive uropathy. Approp
riate enhancement of the right kidney. There is a left-sided double-J ureteral stent. Mild

dilatation of the left intrarenal collecting system. Ureteral stent is appropriately placed. Appropri
ate enhancement of the left kidney.

Mesentery: No mass, free air or free fluid

Alimentary canal: Note is made of a hiatal hernia. No evidence of bowel obstruction. Ileocecal juncti
on is unremarkable. Scattered fecal material in a nondistended, nondilated colon.



PELVIS:  Redemonstration of a large heterogeneous left pelvic mass. Currently, this pelvic mass measu
res 5.1 x 5.8 cm (previously measuring 9.9 x 9.0 cm. Mild urinary bladder wall serosal and mucosal

thickening. Previously noted lymphadenopathy along the left external iliac chain has significantly de
creased in size. Currently, the largest lymph node measures 1.7 x 1.5 cm. Previously, this

conglomeration of lymph nodes measured 4.5 x 3.3 cm.



OSSEOUS STRUCTURES: Redemonstration of multifocal sclerotic lesions compatible with osseous metastase
s. The metastatic lesions appear to be more sclerotic, suggesting possible post treatment change in

the spine. There do appear to be new sclerotic foci in the bony pelvis suggesting possible interval d
evelopment of osseous metastases. No evidence of pathologic fracture.



IMPRESSION:

Partial response to therapy. There appears to be interval decrease in size of a left hemipelvic mass 
as well as retroperitoneal lymphadenopathy. There does appear to be progression of osseous

metastases with increased sclerotic lesions involving the bony pelvis. No associated pathologic fract
ure.



Transcribed Date/Time: 10/14/2019 11:42 AM



Reported By: Savana Early 

Electronically Signed:  10/14/2019 11:57 AM Strong peripheral pulses

## 2024-09-09 ENCOUNTER — HOSPITAL ENCOUNTER (OUTPATIENT)
Dept: HOSPITAL 92 - SCSMRI | Age: 84
Discharge: HOME | End: 2024-09-09
Attending: INTERNAL MEDICINE
Payer: MEDICARE

## 2024-09-09 DIAGNOSIS — C79.51: ICD-10-CM

## 2024-09-09 DIAGNOSIS — I67.89: ICD-10-CM

## 2024-09-09 DIAGNOSIS — H53.8: ICD-10-CM

## 2024-09-09 DIAGNOSIS — C61: Primary | ICD-10-CM

## 2024-09-09 DIAGNOSIS — G93.9: ICD-10-CM

## 2024-09-09 DIAGNOSIS — R97.20: ICD-10-CM

## 2024-09-09 PROCEDURE — 76376 3D RENDER W/INTRP POSTPROCES: CPT

## 2024-09-09 PROCEDURE — 70553 MRI BRAIN STEM W/O & W/DYE: CPT

## 2024-09-18 ENCOUNTER — HOSPITAL ENCOUNTER (INPATIENT)
Dept: HOSPITAL 92 - CCU | Age: 84
LOS: 12 days | Discharge: NURSING, REHAB OR LONG-TERM CARE | DRG: 871 | End: 2024-09-30
Attending: INTERNAL MEDICINE | Admitting: INTERNAL MEDICINE
Payer: MEDICARE

## 2024-09-18 ENCOUNTER — HOSPITAL ENCOUNTER (INPATIENT)
Dept: HOSPITAL 92 - CSHERS | Age: 84
Discharge: HOME | DRG: 871 | End: 2024-09-18
Attending: INTERNAL MEDICINE | Admitting: INTERNAL MEDICINE
Payer: MEDICARE

## 2024-09-18 VITALS — BODY MASS INDEX: 26.2 KG/M2 | BODY MASS INDEX: 28.7 KG/M2

## 2024-09-18 DIAGNOSIS — Z88.8: ICD-10-CM

## 2024-09-18 DIAGNOSIS — N17.9: ICD-10-CM

## 2024-09-18 DIAGNOSIS — Z88.2: ICD-10-CM

## 2024-09-18 DIAGNOSIS — I95.9: ICD-10-CM

## 2024-09-18 DIAGNOSIS — Z79.82: ICD-10-CM

## 2024-09-18 DIAGNOSIS — G93.41: ICD-10-CM

## 2024-09-18 DIAGNOSIS — E87.20: ICD-10-CM

## 2024-09-18 DIAGNOSIS — I12.9: ICD-10-CM

## 2024-09-18 DIAGNOSIS — Z88.1: ICD-10-CM

## 2024-09-18 DIAGNOSIS — Z66: ICD-10-CM

## 2024-09-18 DIAGNOSIS — J18.9: ICD-10-CM

## 2024-09-18 DIAGNOSIS — C79.51: ICD-10-CM

## 2024-09-18 DIAGNOSIS — Z85.828: ICD-10-CM

## 2024-09-18 DIAGNOSIS — Z90.49: ICD-10-CM

## 2024-09-18 DIAGNOSIS — Z88.0: ICD-10-CM

## 2024-09-18 DIAGNOSIS — N40.1: ICD-10-CM

## 2024-09-18 DIAGNOSIS — N18.9: ICD-10-CM

## 2024-09-18 DIAGNOSIS — E78.5: ICD-10-CM

## 2024-09-18 DIAGNOSIS — Z79.2: ICD-10-CM

## 2024-09-18 DIAGNOSIS — I48.0: ICD-10-CM

## 2024-09-18 DIAGNOSIS — A41.9: Primary | ICD-10-CM

## 2024-09-18 DIAGNOSIS — Z79.899: ICD-10-CM

## 2024-09-18 DIAGNOSIS — R65.21: ICD-10-CM

## 2024-09-18 DIAGNOSIS — Z79.01: ICD-10-CM

## 2024-09-18 DIAGNOSIS — Z87.891: ICD-10-CM

## 2024-09-18 DIAGNOSIS — C61: ICD-10-CM

## 2024-09-18 DIAGNOSIS — Z85.46: ICD-10-CM

## 2024-09-18 LAB
ALBUMIN SERPL BCG-MCNC: 2.9 G/DL (ref 3.4–4.8)
ALBUMIN SERPL BCG-MCNC: 3.2 G/DL (ref 3.4–4.8)
ALP SERPL-CCNC: 112 U/L (ref 40–110)
ALP SERPL-CCNC: 117 U/L (ref 40–110)
ALT SERPL W P-5'-P-CCNC: 10 U/L (ref 8–55)
ALT SERPL W P-5'-P-CCNC: 11 U/L (ref 8–55)
ANALYZER IN CARDIO: (no result)
ANION GAP SERPL CALC-SCNC: 12 MMOL/L (ref 10–20)
ANION GAP SERPL CALC-SCNC: 15 MMOL/L (ref 10–20)
ANION GAP SERPL CALC-SCNC: 18 MMOL/L (ref 10–20)
APTT PPP: 28.8 SEC (ref 22–33)
AST SERPL-CCNC: 28 U/L (ref 5–34)
AST SERPL-CCNC: 42 U/L (ref 5–34)
BACTERIA UR QL AUTO: (no result) HPF
BASE EXCESS STD BLDA CALC-SCNC: -2.7 MEQ/L
BASOPHILS # BLD AUTO: 0 10X3/UL (ref 0–0.2)
BASOPHILS # BLD AUTO: 0.01 10X3/UL (ref 0–0.2)
BASOPHILS NFR BLD AUTO: 0 % (ref 0–2)
BASOPHILS NFR BLD AUTO: 0.2 % (ref 0–2)
BILIRUB SERPL-MCNC: 0.5 MG/DL (ref 0.2–1.2)
BILIRUB SERPL-MCNC: 0.6 MG/DL (ref 0.2–1.2)
BUN SERPL-MCNC: 25 MG/DL (ref 8.4–25.7)
BUN SERPL-MCNC: 26 MG/DL (ref 8.4–25.7)
BUN SERPL-MCNC: 27 MG/DL (ref 8.4–25.7)
CA-I BLDA-SCNC: 1.02 MMOL/L (ref 1.12–1.3)
CALCIUM SERPL-MCNC: 7.2 MG/DL (ref 7.8–10.44)
CALCIUM SERPL-MCNC: 7.5 MG/DL (ref 7.8–10.44)
CALCIUM SERPL-MCNC: 7.8 MG/DL (ref 7.8–10.44)
CAUTI INDICATIONS FOR CULTURE: (no result)
CHLORIDE SERPL-SCNC: 105 MMOL/L (ref 98–107)
CHLORIDE SERPL-SCNC: 106 MMOL/L (ref 98–107)
CHLORIDE SERPL-SCNC: 107 MMOL/L (ref 98–107)
CO2 SERPL-SCNC: 17 MMOL/L (ref 23–31)
CO2 SERPL-SCNC: 18 MMOL/L (ref 23–31)
CO2 SERPL-SCNC: 21 MMOL/L (ref 23–31)
CREAT CL PREDICTED SERPL C-G-VRATE: 0 ML/MIN (ref 70–130)
CREAT CL PREDICTED SERPL C-G-VRATE: 0 ML/MIN (ref 70–130)
CREAT CL PREDICTED SERPL C-G-VRATE: 49 ML/MIN (ref 70–130)
D DIMER PPP FEU-MCNC: 1.65 MCG/ML (ref 0.19–0.5)
EOSINOPHIL # BLD AUTO: 0.02 10X3/UL (ref 0–0.5)
EOSINOPHIL # BLD AUTO: 0.02 10X3/UL (ref 0–0.5)
EOSINOPHIL NFR BLD AUTO: 0.3 % (ref 0–6)
EOSINOPHIL NFR BLD AUTO: 0.6 % (ref 0–6)
GLOBULIN SER CALC-MCNC: 2.4 G/DL (ref 2.4–3.5)
GLOBULIN SER CALC-MCNC: 2.5 G/DL (ref 2.4–3.5)
GLUCOSE SERPL-MCNC: 105 MG/DL (ref 83–110)
GLUCOSE SERPL-MCNC: 141 MG/DL (ref 83–110)
GLUCOSE SERPL-MCNC: 88 MG/DL (ref 83–110)
HCO3 BLDA-SCNC: 21.8 MEQ/L (ref 22–28)
HCT VFR BLD CALC: 30.4 % (ref 38.8–50)
HCT VFR BLD CALC: 30.4 % (ref 38.8–50)
HCT VFR BLDA CALC: 31 % (ref 42–52)
HGB BLD-MCNC: 9.8 G/DL (ref 13.5–17.5)
HGB BLD-MCNC: 9.8 G/DL (ref 13.5–17.5)
HGB BLDA-MCNC: 10.4 G/DL (ref 14–18)
INR PPP: 1.1
LYMPHOCYTES NFR BLD AUTO: 10.7 % (ref 18–47)
LYMPHOCYTES NFR BLD AUTO: 11.7 % (ref 18–47)
MAGNESIUM SERPL-MCNC: 1.8 MG/DL (ref 1.6–2.6)
MAGNESIUM SERPL-MCNC: 1.8 MG/DL (ref 1.6–2.6)
MCH RBC QN AUTO: 28.7 PG (ref 27–33)
MCH RBC QN AUTO: 28.8 PG (ref 27–33)
MCV RBC AUTO: 89.1 FL (ref 81.2–95.1)
MCV RBC AUTO: 89.4 FL (ref 81.2–95.1)
MONOCYTES # BLD AUTO: 0.18 10X3/UL (ref 0–1.1)
MONOCYTES # BLD AUTO: 0.67 10X3/UL (ref 0–1.1)
MONOCYTES NFR BLD AUTO: 10.8 % (ref 0–10)
MONOCYTES NFR BLD AUTO: 5.5 % (ref 0–10)
NEUTROPHILS # BLD AUTO: 2.73 10X3/UL (ref 1.5–8.4)
NEUTROPHILS # BLD AUTO: 4.74 10X3/UL (ref 1.5–8.4)
NEUTROPHILS NFR BLD AUTO: 76.7 % (ref 40–75)
NEUTROPHILS NFR BLD AUTO: 83.2 % (ref 40–75)
PCO2 BLDA: 36.6 MMHG (ref 35–45)
PH BLDA: 7.39 [PH] (ref 7.35–7.45)
PLATELET # BLD AUTO: 187 10X3/UL (ref 150–450)
PLATELET # BLD AUTO: 188 10X3/UL (ref 150–450)
PO2 BLDA: 63.6 MMHG (ref 60–?)
POTASSIUM BLD-SCNC: 3.83 MMOL/L (ref 3.7–5.3)
POTASSIUM SERPL-SCNC: 3.7 MMOL/L (ref 3.5–5.1)
POTASSIUM SERPL-SCNC: 4 MMOL/L (ref 3.5–5.1)
POTASSIUM SERPL-SCNC: 4.1 MMOL/L (ref 3.5–5.1)
PROT UR STRIP.AUTO-MCNC: 15 MG/DL
PROTHROMBIN TIME: 12.2 SEC (ref 9.5–12.1)
RBC # BLD AUTO: 3.4 10X6/UL (ref 4.32–5.72)
RBC # BLD AUTO: 3.41 10X6/UL (ref 4.32–5.72)
RBC UR QL AUTO: (no result) HPF (ref 0–3)
SODIUM SERPL-SCNC: 135 MMOL/L (ref 136–145)
SODIUM SERPL-SCNC: 136 MMOL/L (ref 136–145)
SODIUM SERPL-SCNC: 136 MMOL/L (ref 136–145)
SP GR UR STRIP: 1.01 (ref 1–1.03)
TROPONIN I SERPL DL<=0.01 NG/ML-MCNC: 0.02 NG/ML (ref ?–0.03)
WBC # BLD AUTO: 3.3 10X3/UL (ref 3.5–10.5)
WBC # BLD AUTO: 6.2 10X3/UL (ref 3.5–10.5)
WBC UR QL AUTO: (no result) HPF (ref 0–3)

## 2024-09-18 PROCEDURE — 82805 BLOOD GASES W/O2 SATURATION: CPT

## 2024-09-18 PROCEDURE — 85730 THROMBOPLASTIN TIME PARTIAL: CPT

## 2024-09-18 PROCEDURE — 84100 ASSAY OF PHOSPHORUS: CPT

## 2024-09-18 PROCEDURE — 74018 RADEX ABDOMEN 1 VIEW: CPT

## 2024-09-18 PROCEDURE — 96375 TX/PRO/DX INJ NEW DRUG ADDON: CPT

## 2024-09-18 PROCEDURE — 81001 URINALYSIS AUTO W/SCOPE: CPT

## 2024-09-18 PROCEDURE — 71275 CT ANGIOGRAPHY CHEST: CPT

## 2024-09-18 PROCEDURE — 87040 BLOOD CULTURE FOR BACTERIA: CPT

## 2024-09-18 PROCEDURE — P9047 ALBUMIN (HUMAN), 25%, 50ML: HCPCS

## 2024-09-18 PROCEDURE — 85025 COMPLETE CBC W/AUTO DIFF WBC: CPT

## 2024-09-18 PROCEDURE — 94640 AIRWAY INHALATION TREATMENT: CPT

## 2024-09-18 PROCEDURE — 4A033R1 MEASUREMENT OF ARTERIAL SATURATION, PERIPHERAL, PERCUTANEOUS APPROACH: ICD-10-PCS | Performed by: INTERNAL MEDICINE

## 2024-09-18 PROCEDURE — 96374 THER/PROPH/DIAG INJ IV PUSH: CPT

## 2024-09-18 PROCEDURE — 80048 BASIC METABOLIC PNL TOTAL CA: CPT

## 2024-09-18 PROCEDURE — 87086 URINE CULTURE/COLONY COUNT: CPT

## 2024-09-18 PROCEDURE — 93010 ELECTROCARDIOGRAM REPORT: CPT

## 2024-09-18 PROCEDURE — 83605 ASSAY OF LACTIC ACID: CPT

## 2024-09-18 PROCEDURE — 85610 PROTHROMBIN TIME: CPT

## 2024-09-18 PROCEDURE — 3E03329 INTRODUCTION OF OTHER ANTI-INFECTIVE INTO PERIPHERAL VEIN, PERCUTANEOUS APPROACH: ICD-10-PCS | Performed by: STUDENT IN AN ORGANIZED HEALTH CARE EDUCATION/TRAINING PROGRAM

## 2024-09-18 PROCEDURE — 85049 AUTOMATED PLATELET COUNT: CPT

## 2024-09-18 PROCEDURE — 71045 X-RAY EXAM CHEST 1 VIEW: CPT

## 2024-09-18 PROCEDURE — 36415 COLL VENOUS BLD VENIPUNCTURE: CPT

## 2024-09-18 PROCEDURE — 84484 ASSAY OF TROPONIN QUANT: CPT

## 2024-09-18 PROCEDURE — 36600 WITHDRAWAL OF ARTERIAL BLOOD: CPT

## 2024-09-18 PROCEDURE — 36416 COLLJ CAPILLARY BLOOD SPEC: CPT

## 2024-09-18 PROCEDURE — 84145 PROCALCITONIN (PCT): CPT

## 2024-09-18 PROCEDURE — 82533 TOTAL CORTISOL: CPT

## 2024-09-18 PROCEDURE — 70450 CT HEAD/BRAIN W/O DYE: CPT

## 2024-09-18 PROCEDURE — 74177 CT ABD & PELVIS W/CONTRAST: CPT

## 2024-09-18 PROCEDURE — 83880 ASSAY OF NATRIURETIC PEPTIDE: CPT

## 2024-09-18 PROCEDURE — 85018 HEMOGLOBIN: CPT

## 2024-09-18 PROCEDURE — 3E033XZ INTRODUCTION OF VASOPRESSOR INTO PERIPHERAL VEIN, PERCUTANEOUS APPROACH: ICD-10-PCS | Performed by: STUDENT IN AN ORGANIZED HEALTH CARE EDUCATION/TRAINING PROGRAM

## 2024-09-18 PROCEDURE — 85379 FIBRIN DEGRADATION QUANT: CPT

## 2024-09-18 PROCEDURE — 87081 CULTURE SCREEN ONLY: CPT

## 2024-09-18 PROCEDURE — 80202 ASSAY OF VANCOMYCIN: CPT

## 2024-09-18 PROCEDURE — 83735 ASSAY OF MAGNESIUM: CPT

## 2024-09-18 PROCEDURE — 93005 ELECTROCARDIOGRAM TRACING: CPT

## 2024-09-18 PROCEDURE — 80053 COMPREHEN METABOLIC PANEL: CPT

## 2024-09-18 PROCEDURE — 82565 ASSAY OF CREATININE: CPT

## 2024-09-18 PROCEDURE — 97139 UNLISTED THERAPEUTIC PX: CPT

## 2024-09-18 PROCEDURE — 85027 COMPLETE CBC AUTOMATED: CPT

## 2024-09-18 RX ADMIN — ALBUMIN HUMAN SCH GM: 250 SOLUTION INTRAVENOUS at 14:33

## 2024-09-18 RX ADMIN — VANCOMYCIN SCH MLS: 2 INJECTION, SOLUTION INTRAVENOUS at 11:23

## 2024-09-18 RX ADMIN — PANTOPRAZOLE SODIUM SCH: 40 TABLET, DELAYED RELEASE ORAL at 14:05

## 2024-09-18 RX ADMIN — HYDROCORTISONE SODIUM SUCCINATE SCH MG: 100 INJECTION, POWDER, FOR SOLUTION INTRAMUSCULAR; INTRAVENOUS at 17:34

## 2024-09-18 RX ADMIN — MAGNESIUM SULFATE HEPTAHYDRATE SCH MLS: 40 INJECTION, SOLUTION INTRAVENOUS at 20:43

## 2024-09-18 RX ADMIN — KETOROLAC TROMETHAMINE SCH MG: 30 INJECTION, SOLUTION INTRAMUSCULAR at 11:23

## 2024-09-18 RX ADMIN — ASPIRIN SCH: 81 TABLET ORAL at 14:05

## 2024-09-18 RX ADMIN — NOREPINEPHRINE BITARTRATE SCH MLS: 8 INJECTION, SOLUTION INTRAVENOUS at 16:49

## 2024-09-19 LAB
ANION GAP SERPL CALC-SCNC: 13 MMOL/L (ref 10–20)
BUN SERPL-MCNC: 27 MG/DL (ref 8.4–25.7)
BURR CELLS BLD QL SMEAR: (no result) HPF (ref 0–1)
CALCIUM SERPL-MCNC: 7.3 MG/DL (ref 7.8–10.44)
CHLORIDE SERPL-SCNC: 109 MMOL/L (ref 98–107)
CO2 SERPL-SCNC: 19 MMOL/L (ref 23–31)
CREAT CL PREDICTED SERPL C-G-VRATE: 56 ML/MIN (ref 70–130)
DOHLE BOD BLD QL SMEAR: SLIGHT
GLUCOSE SERPL-MCNC: 128 MG/DL (ref 83–110)
HCT VFR BLD CALC: 29.6 % (ref 42–52)
HGB BLD-MCNC: 9.6 G/DL (ref 14–18)
MACROCYTES BLD QL SMEAR: (no result) HPF (ref 0–5)
MCH RBC QN AUTO: 29.2 PG (ref 27–31)
MCV RBC AUTO: 90 FL (ref 78–98)
OVALOCYTES BLD QL SMEAR: (no result) HPF (ref 0–1)
PLATELET # BLD AUTO: 240 10X3/UL (ref 130–400)
POLYCHROMASIA BLD QL SMEAR: (no result) HPF (ref 0–2)
POTASSIUM SERPL-SCNC: 3.9 MMOL/L (ref 3.5–5.1)
RBC # BLD AUTO: 3.29 MILL/UL (ref 4.7–6.1)
SODIUM SERPL-SCNC: 137 MMOL/L (ref 136–145)
TARGETS BLD QL SMEAR: (no result) HPF (ref 0–1)
TOXIC GRANULES BLD QL SMEAR: SLIGHT
VANCOMYCIN SERPL-MCNC: 13.4 UG/ML
WBC # BLD AUTO: 14.1 10X3/UL (ref 4.8–10.8)

## 2024-09-19 RX ADMIN — VANCOMYCIN HYDROCHLORIDE SCH MLS: 10 INJECTION, POWDER, LYOPHILIZED, FOR SOLUTION INTRAVENOUS at 12:50

## 2024-09-19 RX ADMIN — HYDROCORTISONE SODIUM SUCCINATE SCH MG: 100 INJECTION, POWDER, FOR SOLUTION INTRAMUSCULAR; INTRAVENOUS at 12:52

## 2024-09-19 RX ADMIN — ASPIRIN SCH MG: 81 TABLET ORAL at 08:38

## 2024-09-20 LAB
ANION GAP SERPL CALC-SCNC: 13 MMOL/L (ref 10–20)
BASOPHILS # BLD AUTO: (no result) 10X3/UL (ref 0–0.2)
BASOPHILS NFR BLD AUTO: 0.1 % (ref 0–1)
BUN SERPL-MCNC: 19 MG/DL (ref 8.4–25.7)
CALCIUM SERPL-MCNC: 7.2 MG/DL (ref 7.8–10.44)
CHLORIDE SERPL-SCNC: 113 MMOL/L (ref 98–107)
CO2 SERPL-SCNC: 15 MMOL/L (ref 23–31)
CREAT CL PREDICTED SERPL C-G-VRATE: 89 ML/MIN (ref 70–130)
EOSINOPHIL # BLD AUTO: (no result) 10X3/UL (ref 0–0.7)
EOSINOPHIL NFR BLD AUTO: 0.1 % (ref 0–10)
GLUCOSE SERPL-MCNC: 117 MG/DL (ref 83–110)
HCT VFR BLD CALC: 28.2 % (ref 42–52)
HGB BLD-MCNC: 9.1 G/DL (ref 14–18)
LYMPHOCYTES NFR BLD AUTO: 3.5 % (ref 21–51)
MCH RBC QN AUTO: 28.9 PG (ref 27–31)
MCV RBC AUTO: 89.5 FL (ref 78–98)
MONOCYTES # BLD AUTO: 0.7 10X3/UL (ref 0.11–0.59)
MONOCYTES NFR BLD AUTO: 5.2 % (ref 0–10)
NEUTROPHILS # BLD AUTO: 11.2 10X3/UL (ref 1.4–6.5)
NEUTROPHILS NFR BLD AUTO: 90.1 % (ref 42–75)
PLATELET # BLD AUTO: 189 10X3/UL (ref 130–400)
POTASSIUM SERPL-SCNC: 3.5 MMOL/L (ref 3.5–5.1)
RBC # BLD AUTO: 3.15 MILL/UL (ref 4.7–6.1)
SODIUM SERPL-SCNC: 137 MMOL/L (ref 136–145)
VANCOMYCIN SERPL-MCNC: 16.4 UG/ML
WBC # BLD AUTO: 12.4 10X3/UL (ref 4.8–10.8)

## 2024-09-20 RX ADMIN — DOCUSATE SODIUM 50 MG AND SENNOSIDES 8.6 MG PRN TAB: 8.6; 5 TABLET, FILM COATED ORAL at 08:33

## 2024-09-20 RX ADMIN — Medication SCH ML: at 08:35

## 2024-09-21 LAB
ANION GAP SERPL CALC-SCNC: 12 MMOL/L (ref 10–20)
BASOPHILS # BLD AUTO: (no result) 10X3/UL (ref 0–0.2)
BASOPHILS NFR BLD AUTO: 0.1 % (ref 0–1)
BUN SERPL-MCNC: 15 MG/DL (ref 8.4–25.7)
CALCIUM SERPL-MCNC: 7.2 MG/DL (ref 7.8–10.44)
CHLORIDE SERPL-SCNC: 112 MMOL/L (ref 98–107)
CO2 SERPL-SCNC: 20 MMOL/L (ref 23–31)
CREAT CL PREDICTED SERPL C-G-VRATE: 98 ML/MIN (ref 70–130)
EOSINOPHIL # BLD AUTO: 0.1 10X3/UL (ref 0–0.7)
EOSINOPHIL NFR BLD AUTO: 0.6 % (ref 0–10)
GLUCOSE SERPL-MCNC: 99 MG/DL (ref 83–110)
HCT VFR BLD CALC: 26.2 % (ref 42–52)
HGB BLD-MCNC: 8.6 G/DL (ref 14–18)
LYMPHOCYTES NFR BLD AUTO: 6.6 % (ref 21–51)
MCH RBC QN AUTO: 29.4 PG (ref 27–31)
MCV RBC AUTO: 89.4 FL (ref 78–98)
MONOCYTES # BLD AUTO: 0.6 10X3/UL (ref 0.11–0.59)
MONOCYTES NFR BLD AUTO: 6.4 % (ref 0–10)
NEUTROPHILS # BLD AUTO: 7.5 10X3/UL (ref 1.4–6.5)
NEUTROPHILS NFR BLD AUTO: 85.2 % (ref 42–75)
PLATELET # BLD AUTO: 165 10X3/UL (ref 130–400)
POTASSIUM SERPL-SCNC: 3.5 MMOL/L (ref 3.5–5.1)
RBC # BLD AUTO: 2.93 MILL/UL (ref 4.7–6.1)
SODIUM SERPL-SCNC: 140 MMOL/L (ref 136–145)
WBC # BLD AUTO: 8.8 10X3/UL (ref 4.8–10.8)

## 2024-09-22 LAB
ANION GAP SERPL CALC-SCNC: 12 MMOL/L (ref 10–20)
BASOPHILS # BLD AUTO: 0.03 10X3/UL (ref 0–0.2)
BASOPHILS NFR BLD AUTO: 0.5 % (ref 0–1)
BUN SERPL-MCNC: 14 MG/DL (ref 8.4–25.7)
CALCIUM SERPL-MCNC: 7.1 MG/DL (ref 7.8–10.44)
CHLORIDE SERPL-SCNC: 111 MMOL/L (ref 98–107)
CO2 SERPL-SCNC: 20 MMOL/L (ref 23–31)
CREAT CL PREDICTED SERPL C-G-VRATE: 105 ML/MIN (ref 70–130)
EOSINOPHIL # BLD AUTO: 0.1 10X3/UL (ref 0–0.7)
EOSINOPHIL NFR BLD AUTO: 1.7 % (ref 0–10)
GLUCOSE SERPL-MCNC: 96 MG/DL (ref 83–110)
HCT VFR BLD CALC: 26 % (ref 42–52)
HCT VFR BLD CALC: 26.8 % (ref 42–52)
HGB BLD-MCNC: 8.8 G/DL (ref 14–18)
HGB BLD-MCNC: 8.9 G/DL (ref 14–18)
LYMPHOCYTES NFR BLD AUTO: 12.4 % (ref 21–51)
MCH RBC QN AUTO: 28.9 PG (ref 27–31)
MCH RBC QN AUTO: 29.1 PG (ref 27–31)
MCV RBC AUTO: 86.1 FL (ref 78–98)
MCV RBC AUTO: 87 FL (ref 78–98)
MONOCYTES # BLD AUTO: 0.7 10X3/UL (ref 0.11–0.59)
MONOCYTES NFR BLD AUTO: 11.5 % (ref 0–10)
NEUTROPHILS # BLD AUTO: 4.6 10X3/UL (ref 1.4–6.5)
NEUTROPHILS NFR BLD AUTO: 71.4 % (ref 42–75)
PLATELET # BLD AUTO: 159 10X3/UL (ref 130–400)
PLATELET # BLD AUTO: 161 10X3/UL (ref 130–400)
POTASSIUM SERPL-SCNC: 3.7 MMOL/L (ref 3.5–5.1)
RBC # BLD AUTO: 3.02 MILL/UL (ref 4.7–6.1)
RBC # BLD AUTO: 3.08 MILL/UL (ref 4.7–6.1)
SODIUM SERPL-SCNC: 139 MMOL/L (ref 136–145)
WBC # BLD AUTO: 6.5 10X3/UL (ref 4.8–10.8)
WBC # BLD AUTO: 6.5 10X3/UL (ref 4.8–10.8)

## 2024-09-22 RX ADMIN — FUROSEMIDE SCH MG: 10 INJECTION, SOLUTION INTRAMUSCULAR; INTRAVENOUS at 13:19

## 2024-09-22 RX ADMIN — ALBUTEROL SULFATE PRN MG: 2.5 SOLUTION RESPIRATORY (INHALATION) at 13:36

## 2024-09-23 LAB
ANION GAP SERPL CALC-SCNC: 9 MMOL/L (ref 10–20)
BUN SERPL-MCNC: 12 MG/DL (ref 8.4–25.7)
CALCIUM SERPL-MCNC: 7.5 MG/DL (ref 7.8–10.44)
CHLORIDE SERPL-SCNC: 107 MMOL/L (ref 98–107)
CO2 SERPL-SCNC: 24 MMOL/L (ref 23–31)
CREAT CL PREDICTED SERPL C-G-VRATE: 111 ML/MIN (ref 70–130)
GLUCOSE SERPL-MCNC: 119 MG/DL (ref 83–110)
POTASSIUM SERPL-SCNC: 3.9 MMOL/L (ref 3.5–5.1)
SODIUM SERPL-SCNC: 136 MMOL/L (ref 136–145)

## 2024-09-23 RX ADMIN — DOCUSATE SODIUM 50 MG AND SENNOSIDES 8.6 MG PRN TAB: 8.6; 5 TABLET, FILM COATED ORAL at 21:21

## 2024-09-25 LAB
ANION GAP SERPL CALC-SCNC: 11 MMOL/L (ref 10–20)
BUN SERPL-MCNC: 12 MG/DL (ref 8.4–25.7)
CALCIUM SERPL-MCNC: 7.5 MG/DL (ref 7.8–10.44)
CHLORIDE SERPL-SCNC: 102 MMOL/L (ref 98–107)
CO2 SERPL-SCNC: 23 MMOL/L (ref 23–31)
CREAT CL PREDICTED SERPL C-G-VRATE: 111 ML/MIN (ref 70–130)
GLUCOSE SERPL-MCNC: 101 MG/DL (ref 83–110)
HCT VFR BLD CALC: 27.2 % (ref 42–52)
HGB BLD-MCNC: 9 G/DL (ref 14–18)
MCH RBC QN AUTO: 28.7 PG (ref 27–31)
MCV RBC AUTO: 86.6 FL (ref 78–98)
PLATELET # BLD AUTO: 171 10X3/UL (ref 130–400)
POTASSIUM SERPL-SCNC: 3.8 MMOL/L (ref 3.5–5.1)
RBC # BLD AUTO: 3.14 MILL/UL (ref 4.7–6.1)
SODIUM SERPL-SCNC: 132 MMOL/L (ref 136–145)
WBC # BLD AUTO: 5 10X3/UL (ref 4.8–10.8)

## 2024-09-26 RX ADMIN — PANTOPRAZOLE SODIUM SCH MG: 40 TABLET, DELAYED RELEASE ORAL at 09:04

## 2024-09-28 LAB
CREAT CL PREDICTED SERPL C-G-VRATE: 96 ML/MIN (ref 70–130)
HGB BLD-MCNC: 10.6 G/DL (ref 14–18)
PLATELET # BLD AUTO: 184 10X3/UL (ref 130–400)

## 2024-09-29 LAB
ANION GAP SERPL CALC-SCNC: 13 MMOL/L (ref 10–20)
BUN SERPL-MCNC: 18 MG/DL (ref 8.4–25.7)
CALCIUM SERPL-MCNC: 8.7 MG/DL (ref 7.8–10.44)
CHLORIDE SERPL-SCNC: 99 MMOL/L (ref 98–107)
CO2 SERPL-SCNC: 28 MMOL/L (ref 23–31)
CREAT CL PREDICTED SERPL C-G-VRATE: 72 ML/MIN (ref 70–130)
GLUCOSE SERPL-MCNC: 87 MG/DL (ref 83–110)
POTASSIUM SERPL-SCNC: 4.4 MMOL/L (ref 3.5–5.1)
SODIUM SERPL-SCNC: 136 MMOL/L (ref 136–145)

## 2024-09-30 VITALS — DIASTOLIC BLOOD PRESSURE: 64 MMHG | TEMPERATURE: 98.2 F | SYSTOLIC BLOOD PRESSURE: 122 MMHG

## 2024-10-08 ENCOUNTER — HOSPITAL ENCOUNTER (INPATIENT)
Dept: HOSPITAL 92 - ERS | Age: 84
LOS: 7 days | Discharge: NURSING, REHAB OR LONG-TERM CARE | DRG: 369 | End: 2024-10-15
Attending: STUDENT IN AN ORGANIZED HEALTH CARE EDUCATION/TRAINING PROGRAM | Admitting: INTERNAL MEDICINE
Payer: MEDICARE

## 2024-10-08 VITALS — BODY MASS INDEX: 24.3 KG/M2

## 2024-10-08 DIAGNOSIS — D62: ICD-10-CM

## 2024-10-08 DIAGNOSIS — K21.01: Primary | ICD-10-CM

## 2024-10-08 DIAGNOSIS — Z88.0: ICD-10-CM

## 2024-10-08 DIAGNOSIS — K21.9: ICD-10-CM

## 2024-10-08 DIAGNOSIS — Z88.8: ICD-10-CM

## 2024-10-08 DIAGNOSIS — Z85.828: ICD-10-CM

## 2024-10-08 DIAGNOSIS — G93.40: ICD-10-CM

## 2024-10-08 DIAGNOSIS — Z88.2: ICD-10-CM

## 2024-10-08 DIAGNOSIS — N40.0: ICD-10-CM

## 2024-10-08 DIAGNOSIS — C61: ICD-10-CM

## 2024-10-08 DIAGNOSIS — I48.91: ICD-10-CM

## 2024-10-08 DIAGNOSIS — Z90.49: ICD-10-CM

## 2024-10-08 DIAGNOSIS — E78.5: ICD-10-CM

## 2024-10-08 DIAGNOSIS — Z66: ICD-10-CM

## 2024-10-08 DIAGNOSIS — Z92.21: ICD-10-CM

## 2024-10-08 DIAGNOSIS — I10: ICD-10-CM

## 2024-10-08 DIAGNOSIS — Z79.01: ICD-10-CM

## 2024-10-08 DIAGNOSIS — K59.00: ICD-10-CM

## 2024-10-08 DIAGNOSIS — Z91.018: ICD-10-CM

## 2024-10-08 DIAGNOSIS — Z87.891: ICD-10-CM

## 2024-10-08 LAB
ALBUMIN SERPL BCG-MCNC: 2.8 G/DL (ref 3.4–4.8)
ALP SERPL-CCNC: 143 U/L (ref 40–110)
ALT SERPL W P-5'-P-CCNC: 7 U/L (ref 8–55)
ANION GAP SERPL CALC-SCNC: 14 MMOL/L (ref 10–20)
APTT PPP: 43.9 SEC (ref 22.9–36.1)
AST SERPL-CCNC: 17 U/L (ref 5–34)
BASOPHILS # BLD AUTO: (no result) 10X3/UL (ref 0–0.2)
BASOPHILS # BLD AUTO: (no result) 10X3/UL (ref 0–0.2)
BASOPHILS NFR BLD AUTO: 0 % (ref 0–1)
BASOPHILS NFR BLD AUTO: 0.1 % (ref 0–1)
BILIRUB SERPL-MCNC: 0.8 MG/DL (ref 0.2–1.2)
BUN SERPL-MCNC: 23 MG/DL (ref 8.4–25.7)
CALCIUM SERPL-MCNC: 9.1 MG/DL (ref 7.8–10.44)
CHLORIDE SERPL-SCNC: 97 MMOL/L (ref 98–107)
CO2 SERPL-SCNC: 24 MMOL/L (ref 23–31)
CREAT CL PREDICTED SERPL C-G-VRATE: 0 ML/MIN (ref 70–130)
EOSINOPHIL # BLD AUTO: (no result) 10X3/UL (ref 0–0.7)
EOSINOPHIL # BLD AUTO: (no result) 10X3/UL (ref 0–0.7)
EOSINOPHIL NFR BLD AUTO: 0 % (ref 0–10)
EOSINOPHIL NFR BLD AUTO: 0.2 % (ref 0–10)
GLOBULIN SER CALC-MCNC: 3.5 G/DL (ref 2.4–3.5)
GLUCOSE SERPL-MCNC: 132 MG/DL (ref 83–110)
HCT VFR BLD CALC: 31 % (ref 42–52)
HCT VFR BLD CALC: 33 % (ref 42–52)
HGB BLD-MCNC: 10.2 G/DL (ref 14–18)
HGB BLD-MCNC: 10.7 G/DL (ref 14–18)
INR PPP: 1.7
LIPASE SERPL-CCNC: 24 U/L (ref 8–78)
LYMPHOCYTES NFR BLD AUTO: 6.7 % (ref 21–51)
LYMPHOCYTES NFR BLD AUTO: 7.4 % (ref 21–51)
MCH RBC QN AUTO: 28.8 PG (ref 27–31)
MCH RBC QN AUTO: 29.1 PG (ref 27–31)
MCV RBC AUTO: 88.3 FL (ref 78–98)
MCV RBC AUTO: 88.9 FL (ref 78–98)
MONOCYTES # BLD AUTO: 0.5 10X3/UL (ref 0.11–0.59)
MONOCYTES # BLD AUTO: 0.5 10X3/UL (ref 0.11–0.59)
MONOCYTES NFR BLD AUTO: 7.1 % (ref 0–10)
MONOCYTES NFR BLD AUTO: 8.2 % (ref 0–10)
NEUTROPHILS # BLD AUTO: 5.3 10X3/UL (ref 1.4–6.5)
NEUTROPHILS # BLD AUTO: 6.3 10X3/UL (ref 1.4–6.5)
NEUTROPHILS NFR BLD AUTO: 84.1 % (ref 42–75)
NEUTROPHILS NFR BLD AUTO: 84.6 % (ref 42–75)
PLATELET # BLD AUTO: 233 10X3/UL (ref 130–400)
PLATELET # BLD AUTO: 269 10X3/UL (ref 130–400)
POTASSIUM SERPL-SCNC: 4.8 MMOL/L (ref 3.5–5.1)
PROTHROMBIN TIME: 20.4 SEC (ref 12–14.7)
RBC # BLD AUTO: 3.51 MILL/UL (ref 4.7–6.1)
RBC # BLD AUTO: 3.71 MILL/UL (ref 4.7–6.1)
SODIUM SERPL-SCNC: 130 MMOL/L (ref 136–145)
TROPONIN I SERPL DL<=0.01 NG/ML-MCNC: (no result) NG/ML (ref ?–0.03)
WBC # BLD AUTO: 6.3 10X3/UL (ref 4.8–10.8)
WBC # BLD AUTO: 7.4 10X3/UL (ref 4.8–10.8)

## 2024-10-08 PROCEDURE — 74177 CT ABD & PELVIS W/CONTRAST: CPT

## 2024-10-08 PROCEDURE — 86901 BLOOD TYPING SEROLOGIC RH(D): CPT

## 2024-10-08 PROCEDURE — 96374 THER/PROPH/DIAG INJ IV PUSH: CPT

## 2024-10-08 PROCEDURE — 85730 THROMBOPLASTIN TIME PARTIAL: CPT

## 2024-10-08 PROCEDURE — 71045 X-RAY EXAM CHEST 1 VIEW: CPT

## 2024-10-08 PROCEDURE — 80048 BASIC METABOLIC PNL TOTAL CA: CPT

## 2024-10-08 PROCEDURE — 85025 COMPLETE CBC W/AUTO DIFF WBC: CPT

## 2024-10-08 PROCEDURE — 86900 BLOOD TYPING SEROLOGIC ABO: CPT

## 2024-10-08 PROCEDURE — 84484 ASSAY OF TROPONIN QUANT: CPT

## 2024-10-08 PROCEDURE — 70450 CT HEAD/BRAIN W/O DYE: CPT

## 2024-10-08 PROCEDURE — 93005 ELECTROCARDIOGRAM TRACING: CPT

## 2024-10-08 PROCEDURE — 80053 COMPREHEN METABOLIC PANEL: CPT

## 2024-10-08 PROCEDURE — 96375 TX/PRO/DX INJ NEW DRUG ADDON: CPT

## 2024-10-08 PROCEDURE — 36415 COLL VENOUS BLD VENIPUNCTURE: CPT

## 2024-10-08 PROCEDURE — 36416 COLLJ CAPILLARY BLOOD SPEC: CPT

## 2024-10-08 PROCEDURE — 86850 RBC ANTIBODY SCREEN: CPT

## 2024-10-08 PROCEDURE — 85610 PROTHROMBIN TIME: CPT

## 2024-10-08 PROCEDURE — 83690 ASSAY OF LIPASE: CPT

## 2024-10-08 PROCEDURE — 97139 UNLISTED THERAPEUTIC PX: CPT

## 2024-10-09 LAB
ANION GAP SERPL CALC-SCNC: 14 MMOL/L (ref 10–20)
BASOPHILS # BLD AUTO: (no result) 10X3/UL (ref 0–0.2)
BASOPHILS NFR BLD AUTO: 0 % (ref 0–1)
BUN SERPL-MCNC: 33 MG/DL (ref 8.4–25.7)
CALCIUM SERPL-MCNC: 8.4 MG/DL (ref 7.8–10.44)
CHLORIDE SERPL-SCNC: 101 MMOL/L (ref 98–107)
CO2 SERPL-SCNC: 23 MMOL/L (ref 23–31)
CREAT CL PREDICTED SERPL C-G-VRATE: 71 ML/MIN (ref 70–130)
EOSINOPHIL # BLD AUTO: (no result) 10X3/UL (ref 0–0.7)
EOSINOPHIL NFR BLD AUTO: 0 % (ref 0–10)
GLUCOSE SERPL-MCNC: 116 MG/DL (ref 83–110)
HCT VFR BLD CALC: 27.4 % (ref 42–52)
HGB BLD-MCNC: 9 G/DL (ref 14–18)
LYMPHOCYTES NFR BLD AUTO: 10.9 % (ref 21–51)
MCH RBC QN AUTO: 28.1 PG (ref 27–31)
MCV RBC AUTO: 85.6 FL (ref 78–98)
MONOCYTES # BLD AUTO: 0.7 10X3/UL (ref 0.11–0.59)
MONOCYTES NFR BLD AUTO: 11.1 % (ref 0–10)
NEUTROPHILS # BLD AUTO: 4.5 10X3/UL (ref 1.4–6.5)
NEUTROPHILS NFR BLD AUTO: 77.1 % (ref 42–75)
PLATELET # BLD AUTO: 262 10X3/UL (ref 130–400)
POTASSIUM SERPL-SCNC: 4.7 MMOL/L (ref 3.5–5.1)
RBC # BLD AUTO: 3.2 MILL/UL (ref 4.7–6.1)
SODIUM SERPL-SCNC: 133 MMOL/L (ref 136–145)
WBC # BLD AUTO: 5.9 10X3/UL (ref 4.8–10.8)

## 2024-10-09 PROCEDURE — 0W3P8ZZ CONTROL BLEEDING IN GASTROINTESTINAL TRACT, VIA NATURAL OR ARTIFICIAL OPENING ENDOSCOPIC: ICD-10-PCS | Performed by: INTERNAL MEDICINE

## 2024-10-09 RX ADMIN — FAMOTIDINE SCH MG: 10 INJECTION, SOLUTION INTRAVENOUS at 00:27

## 2024-10-09 RX ADMIN — DOCUSATE SODIUM 50 MG AND SENNOSIDES 8.6 MG SCH TAB: 8.6; 5 TABLET, FILM COATED ORAL at 21:17

## 2024-10-10 LAB
ANION GAP SERPL CALC-SCNC: 11 MMOL/L (ref 10–20)
BASOPHILS # BLD AUTO: (no result) 10X3/UL (ref 0–0.2)
BASOPHILS NFR BLD AUTO: 0.4 % (ref 0–1)
BUN SERPL-MCNC: 25 MG/DL (ref 8.4–25.7)
CALCIUM SERPL-MCNC: 7.7 MG/DL (ref 7.8–10.44)
CHLORIDE SERPL-SCNC: 108 MMOL/L (ref 98–107)
CO2 SERPL-SCNC: 22 MMOL/L (ref 23–31)
CREAT CL PREDICTED SERPL C-G-VRATE: 74 ML/MIN (ref 70–130)
EOSINOPHIL # BLD AUTO: 0.1 10X3/UL (ref 0–0.7)
EOSINOPHIL NFR BLD AUTO: 1.3 % (ref 0–10)
GLUCOSE SERPL-MCNC: 118 MG/DL (ref 83–110)
HCT VFR BLD CALC: 25 % (ref 42–52)
HGB BLD-MCNC: 8.1 G/DL (ref 14–18)
LYMPHOCYTES NFR BLD AUTO: 15.8 % (ref 21–51)
MCH RBC QN AUTO: 28.1 PG (ref 27–31)
MCV RBC AUTO: 86.8 FL (ref 78–98)
MONOCYTES # BLD AUTO: 0.7 10X3/UL (ref 0.11–0.59)
MONOCYTES NFR BLD AUTO: 12.4 % (ref 0–10)
NEUTROPHILS # BLD AUTO: 3.7 10X3/UL (ref 1.4–6.5)
NEUTROPHILS NFR BLD AUTO: 69.5 % (ref 42–75)
PLATELET # BLD AUTO: 223 10X3/UL (ref 130–400)
POTASSIUM SERPL-SCNC: 4.2 MMOL/L (ref 3.5–5.1)
RBC # BLD AUTO: 2.88 MILL/UL (ref 4.7–6.1)
SODIUM SERPL-SCNC: 137 MMOL/L (ref 136–145)
WBC # BLD AUTO: 5.3 10X3/UL (ref 4.8–10.8)

## 2024-10-11 LAB
ANION GAP SERPL CALC-SCNC: 9 MMOL/L (ref 10–20)
BASOPHILS # BLD AUTO: (no result) 10X3/UL (ref 0–0.2)
BASOPHILS NFR BLD AUTO: 0.3 % (ref 0–1)
BUN SERPL-MCNC: 19 MG/DL (ref 8.4–25.7)
CALCIUM SERPL-MCNC: 7.1 MG/DL (ref 7.8–10.44)
CHLORIDE SERPL-SCNC: 108 MMOL/L (ref 98–107)
CO2 SERPL-SCNC: 21 MMOL/L (ref 23–31)
CREAT CL PREDICTED SERPL C-G-VRATE: 83 ML/MIN (ref 70–130)
EOSINOPHIL # BLD AUTO: 0.1 10X3/UL (ref 0–0.7)
EOSINOPHIL NFR BLD AUTO: 3.6 % (ref 0–10)
GLUCOSE SERPL-MCNC: 93 MG/DL (ref 83–110)
HCT VFR BLD CALC: 22.3 % (ref 42–52)
HGB BLD-MCNC: 7.2 G/DL (ref 14–18)
LYMPHOCYTES NFR BLD AUTO: 20.4 % (ref 21–51)
MCH RBC QN AUTO: 28.6 PG (ref 27–31)
MCV RBC AUTO: 88.5 FL (ref 78–98)
MONOCYTES # BLD AUTO: 0.5 10X3/UL (ref 0.11–0.59)
MONOCYTES NFR BLD AUTO: 12.4 % (ref 0–10)
NEUTROPHILS # BLD AUTO: 2.4 10X3/UL (ref 1.4–6.5)
NEUTROPHILS NFR BLD AUTO: 62.3 % (ref 42–75)
PLATELET # BLD AUTO: 202 10X3/UL (ref 130–400)
POTASSIUM SERPL-SCNC: 4.1 MMOL/L (ref 3.5–5.1)
RBC # BLD AUTO: 2.52 MILL/UL (ref 4.7–6.1)
SODIUM SERPL-SCNC: 134 MMOL/L (ref 136–145)
WBC # BLD AUTO: 3.9 10X3/UL (ref 4.8–10.8)

## 2024-10-11 RX ADMIN — GLYCERIN PRN EACH: 2 SUPPOSITORY RECTAL at 14:57

## 2024-10-12 LAB
ANION GAP SERPL CALC-SCNC: 12 MMOL/L (ref 10–20)
BASOPHILS # BLD AUTO: (no result) 10X3/UL (ref 0–0.2)
BASOPHILS NFR BLD AUTO: 0.4 % (ref 0–1)
BUN SERPL-MCNC: 16 MG/DL (ref 8.4–25.7)
CALCIUM SERPL-MCNC: 7.2 MG/DL (ref 7.8–10.44)
CHLORIDE SERPL-SCNC: 106 MMOL/L (ref 98–107)
CO2 SERPL-SCNC: 21 MMOL/L (ref 23–31)
CREAT CL PREDICTED SERPL C-G-VRATE: 85 ML/MIN (ref 70–130)
EOSINOPHIL # BLD AUTO: 0.2 10X3/UL (ref 0–0.7)
EOSINOPHIL NFR BLD AUTO: 3.8 % (ref 0–10)
GLUCOSE SERPL-MCNC: 92 MG/DL (ref 83–110)
HCT VFR BLD CALC: 23.1 % (ref 42–52)
HGB BLD-MCNC: 7.3 G/DL (ref 14–18)
LYMPHOCYTES NFR BLD AUTO: 16.5 % (ref 21–51)
MCH RBC QN AUTO: 28.7 PG (ref 27–31)
MCV RBC AUTO: 90.9 FL (ref 78–98)
MONOCYTES # BLD AUTO: 0.5 10X3/UL (ref 0.11–0.59)
MONOCYTES NFR BLD AUTO: 11.4 % (ref 0–10)
NEUTROPHILS # BLD AUTO: 3 10X3/UL (ref 1.4–6.5)
NEUTROPHILS NFR BLD AUTO: 66.6 % (ref 42–75)
PLATELET # BLD AUTO: 178 10X3/UL (ref 130–400)
POTASSIUM SERPL-SCNC: 4.1 MMOL/L (ref 3.5–5.1)
RBC # BLD AUTO: 2.54 MILL/UL (ref 4.7–6.1)
SODIUM SERPL-SCNC: 135 MMOL/L (ref 136–145)
WBC # BLD AUTO: 4.5 10X3/UL (ref 4.8–10.8)

## 2024-10-13 LAB
ANION GAP SERPL CALC-SCNC: 12 MMOL/L (ref 10–20)
BASOPHILS # BLD AUTO: (no result) 10X3/UL (ref 0–0.2)
BASOPHILS NFR BLD AUTO: 0.1 % (ref 0–1)
BUN SERPL-MCNC: 16 MG/DL (ref 8.4–25.7)
CALCIUM SERPL-MCNC: 7.5 MG/DL (ref 7.8–10.44)
CHLORIDE SERPL-SCNC: 104 MMOL/L (ref 98–107)
CO2 SERPL-SCNC: 20 MMOL/L (ref 23–31)
CREAT CL PREDICTED SERPL C-G-VRATE: 77 ML/MIN (ref 70–130)
EOSINOPHIL # BLD AUTO: 0 10X3/UL (ref 0–0.7)
EOSINOPHIL NFR BLD AUTO: 0.5 % (ref 0–10)
GLUCOSE SERPL-MCNC: 99 MG/DL (ref 83–110)
HCT VFR BLD CALC: 27 % (ref 42–52)
HGB BLD-MCNC: 8.4 G/DL (ref 14–18)
LYMPHOCYTES NFR BLD AUTO: 8 % (ref 21–51)
MCH RBC QN AUTO: 28.6 PG (ref 27–31)
MCV RBC AUTO: 91.8 FL (ref 78–98)
MONOCYTES # BLD AUTO: 0.6 10X3/UL (ref 0.11–0.59)
MONOCYTES NFR BLD AUTO: 7.8 % (ref 0–10)
NEUTROPHILS # BLD AUTO: 6.7 10X3/UL (ref 1.4–6.5)
NEUTROPHILS NFR BLD AUTO: 83 % (ref 42–75)
PLATELET # BLD AUTO: 210 10X3/UL (ref 130–400)
POTASSIUM SERPL-SCNC: 4.3 MMOL/L (ref 3.5–5.1)
RBC # BLD AUTO: 2.94 MILL/UL (ref 4.7–6.1)
SODIUM SERPL-SCNC: 132 MMOL/L (ref 136–145)
WBC # BLD AUTO: 8.1 10X3/UL (ref 4.8–10.8)

## 2024-10-14 LAB
ANION GAP SERPL CALC-SCNC: 12 MMOL/L (ref 10–20)
BASOPHILS # BLD AUTO: (no result) 10X3/UL (ref 0–0.2)
BASOPHILS NFR BLD AUTO: 0.1 % (ref 0–1)
BUN SERPL-MCNC: 17 MG/DL (ref 8.4–25.7)
CALCIUM SERPL-MCNC: 7.4 MG/DL (ref 7.8–10.44)
CHLORIDE SERPL-SCNC: 107 MMOL/L (ref 98–107)
CO2 SERPL-SCNC: 20 MMOL/L (ref 23–31)
CREAT CL PREDICTED SERPL C-G-VRATE: 75 ML/MIN (ref 70–130)
EOSINOPHIL # BLD AUTO: 0 10X3/UL (ref 0–0.7)
EOSINOPHIL NFR BLD AUTO: 0.4 % (ref 0–10)
GLUCOSE SERPL-MCNC: 112 MG/DL (ref 83–110)
HCT VFR BLD CALC: 25.2 % (ref 42–52)
HGB BLD-MCNC: 8 G/DL (ref 14–18)
LYMPHOCYTES NFR BLD AUTO: 9.1 % (ref 21–51)
MCH RBC QN AUTO: 27.9 PG (ref 27–31)
MCV RBC AUTO: 87.8 FL (ref 78–98)
MONOCYTES # BLD AUTO: 0.9 10X3/UL (ref 0.11–0.59)
MONOCYTES NFR BLD AUTO: 8.8 % (ref 0–10)
NEUTROPHILS # BLD AUTO: 8.1 10X3/UL (ref 1.4–6.5)
NEUTROPHILS NFR BLD AUTO: 80.6 % (ref 42–75)
PLATELET # BLD AUTO: 223 10X3/UL (ref 130–400)
POTASSIUM SERPL-SCNC: 4.2 MMOL/L (ref 3.5–5.1)
RBC # BLD AUTO: 2.87 MILL/UL (ref 4.7–6.1)
SODIUM SERPL-SCNC: 135 MMOL/L (ref 136–145)
WBC # BLD AUTO: 10 10X3/UL (ref 4.8–10.8)

## 2024-10-15 VITALS — TEMPERATURE: 97.7 F | DIASTOLIC BLOOD PRESSURE: 64 MMHG | SYSTOLIC BLOOD PRESSURE: 116 MMHG
